# Patient Record
Sex: FEMALE | Race: BLACK OR AFRICAN AMERICAN | NOT HISPANIC OR LATINO | Employment: OTHER | URBAN - METROPOLITAN AREA
[De-identification: names, ages, dates, MRNs, and addresses within clinical notes are randomized per-mention and may not be internally consistent; named-entity substitution may affect disease eponyms.]

---

## 2023-04-21 DIAGNOSIS — I50.32 CHRONIC HEART FAILURE WITH PRESERVED EJECTION FRACTION (MULTI): Primary | ICD-10-CM

## 2023-04-28 DIAGNOSIS — I50.32 CHRONIC HEART FAILURE WITH PRESERVED EJECTION FRACTION (MULTI): ICD-10-CM

## 2023-04-28 RX ORDER — METOLAZONE 5 MG/1
TABLET ORAL
Qty: 4 TABLET | Refills: 11 | Status: SHIPPED | OUTPATIENT
Start: 2023-04-28 | End: 2023-10-10 | Stop reason: SDUPTHER

## 2023-04-28 RX ORDER — METOLAZONE 5 MG/1
TABLET ORAL
Qty: 4 TABLET | Refills: 11 | Status: SHIPPED | OUTPATIENT
Start: 2023-04-28 | End: 2023-04-28 | Stop reason: SDUPTHER

## 2023-05-16 LAB
ALANINE AMINOTRANSFERASE (SGPT) (U/L) IN SER/PLAS: 6 U/L (ref 7–45)
ALBUMIN (G/DL) IN SER/PLAS: 3.6 G/DL (ref 3.4–5)
ALKALINE PHOSPHATASE (U/L) IN SER/PLAS: 85 U/L (ref 33–110)
ANION GAP IN SER/PLAS: 12 MMOL/L (ref 10–20)
ASPARTATE AMINOTRANSFERASE (SGOT) (U/L) IN SER/PLAS: 25 U/L (ref 9–39)
BASOPHILS (10*3/UL) IN BLOOD BY AUTOMATED COUNT: NORMAL
BASOPHILS/100 LEUKOCYTES IN BLOOD BY AUTOMATED COUNT: NORMAL
BILIRUBIN TOTAL (MG/DL) IN SER/PLAS: 1.3 MG/DL (ref 0–1.2)
CALCIUM (MG/DL) IN SER/PLAS: 9 MG/DL (ref 8.6–10.6)
CARBON DIOXIDE, TOTAL (MMOL/L) IN SER/PLAS: 45 MMOL/L (ref 21–32)
CHLORIDE (MMOL/L) IN SER/PLAS: 89 MMOL/L (ref 98–107)
CREATININE (MG/DL) IN SER/PLAS: 1.09 MG/DL (ref 0.5–1.05)
EOSINOPHILS (10*3/UL) IN BLOOD BY AUTOMATED COUNT: NORMAL
EOSINOPHILS/100 LEUKOCYTES IN BLOOD BY AUTOMATED COUNT: NORMAL
ERYTHROCYTE DISTRIBUTION WIDTH (RATIO) BY AUTOMATED COUNT: NORMAL
ERYTHROCYTE MEAN CORPUSCULAR HEMOGLOBIN CONCENTRATION (G/DL) BY AUTOMATED: NORMAL
ERYTHROCYTE MEAN CORPUSCULAR VOLUME (FL) BY AUTOMATED COUNT: NORMAL
ERYTHROCYTES (10*6/UL) IN BLOOD BY AUTOMATED COUNT: NORMAL
FERRITIN (UG/LL) IN SER/PLAS: 38 UG/L (ref 8–150)
GFR FEMALE: 61 ML/MIN/1.73M2
GLUCOSE (MG/DL) IN SER/PLAS: 86 MG/DL (ref 74–99)
HEMATOCRIT (%) IN BLOOD BY AUTOMATED COUNT: NORMAL
HEMOGLOBIN (G/DL) IN BLOOD: NORMAL
HEMOGLOBIN (PG) IN RETICULOCYTES: NORMAL
IMMATURE GRANULOCYTES/100 LEUKOCYTES IN BLOOD BY AUTOMATED COUNT: NORMAL
IMMATURE RETIC FRACTION: NORMAL
IRON (UG/DL) IN SER/PLAS: 78 UG/DL (ref 35–150)
IRON BINDING CAPACITY (UG/DL) IN SER/PLAS: 429 UG/DL (ref 240–445)
IRON SATURATION (%) IN SER/PLAS: 18 % (ref 25–45)
LACTATE DEHYDROGENASE (U/L) IN SER/PLAS BY LAC->PYR RXN: 281 U/L (ref 84–246)
LEUKOCYTES (10*3/UL) IN BLOOD BY AUTOMATED COUNT: NORMAL
LYMPHOCYTES (10*3/UL) IN BLOOD BY AUTOMATED COUNT: NORMAL
LYMPHOCYTES/100 LEUKOCYTES IN BLOOD BY AUTOMATED COUNT: NORMAL
MAGNESIUM (MG/DL) IN SER/PLAS: 2.07 MG/DL (ref 1.6–2.4)
MANUAL DIFFERENTIAL Y/N: NORMAL
MONOCYTES (10*3/UL) IN BLOOD BY AUTOMATED COUNT: NORMAL
MONOCYTES/100 LEUKOCYTES IN BLOOD BY AUTOMATED COUNT: NORMAL
NEUTROPHILS (10*3/UL) IN BLOOD BY AUTOMATED COUNT: NORMAL
NEUTROPHILS/100 LEUKOCYTES IN BLOOD BY AUTOMATED COUNT: NORMAL
NRBC (PER 100 WBCS) BY AUTOMATED COUNT: NORMAL
PLATELETS (10*3/UL) IN BLOOD AUTOMATED COUNT: NORMAL
POTASSIUM (MMOL/L) IN SER/PLAS: 3 MMOL/L (ref 3.5–5.3)
PROTEIN TOTAL: 7.7 G/DL (ref 6.4–8.2)
RETICULOCYTES (10*3/UL) IN BLOOD: NORMAL
RETICULOCYTES/100 ERYTHROCYTES IN BLOOD BY AUTOMATED COUNT: NORMAL
SODIUM (MMOL/L) IN SER/PLAS: 143 MMOL/L (ref 136–145)
UREA NITROGEN (MG/DL) IN SER/PLAS: 14 MG/DL (ref 6–23)

## 2023-05-17 LAB — HOMOCYSTEINE (UMOL/L) IN SER/PLAS: 16.08 UMOL/L (ref 5–13.9)

## 2023-06-02 LAB
BASOPHILS (10*3/UL) IN BLOOD BY AUTOMATED COUNT: 0.04 X10E9/L (ref 0–0.1)
BASOPHILS/100 LEUKOCYTES IN BLOOD BY AUTOMATED COUNT: 0.9 % (ref 0–2)
EOSINOPHILS (10*3/UL) IN BLOOD BY AUTOMATED COUNT: 0.14 X10E9/L (ref 0–0.7)
EOSINOPHILS/100 LEUKOCYTES IN BLOOD BY AUTOMATED COUNT: 3 % (ref 0–6)
ERYTHROCYTE DISTRIBUTION WIDTH (RATIO) BY AUTOMATED COUNT: 16.6 % (ref 11.5–14.5)
ERYTHROCYTE MEAN CORPUSCULAR HEMOGLOBIN CONCENTRATION (G/DL) BY AUTOMATED: 30.9 G/DL (ref 32–36)
ERYTHROCYTE MEAN CORPUSCULAR VOLUME (FL) BY AUTOMATED COUNT: 99 FL (ref 80–100)
ERYTHROCYTES (10*6/UL) IN BLOOD BY AUTOMATED COUNT: 3.8 X10E12/L (ref 4–5.2)
HEMATOCRIT (%) IN BLOOD BY AUTOMATED COUNT: 37.6 % (ref 36–46)
HEMOGLOBIN (G/DL) IN BLOOD: 11.6 G/DL (ref 12–16)
IMMATURE GRANULOCYTES/100 LEUKOCYTES IN BLOOD BY AUTOMATED COUNT: 0.2 % (ref 0–0.9)
LEUKOCYTES (10*3/UL) IN BLOOD BY AUTOMATED COUNT: 4.7 X10E9/L (ref 4.4–11.3)
LYMPHOCYTES (10*3/UL) IN BLOOD BY AUTOMATED COUNT: 0.92 X10E9/L (ref 1.2–4.8)
LYMPHOCYTES/100 LEUKOCYTES IN BLOOD BY AUTOMATED COUNT: 19.8 % (ref 13–44)
MONOCYTES (10*3/UL) IN BLOOD BY AUTOMATED COUNT: 0.72 X10E9/L (ref 0.1–1)
MONOCYTES/100 LEUKOCYTES IN BLOOD BY AUTOMATED COUNT: 15.5 % (ref 2–10)
NEUTROPHILS (10*3/UL) IN BLOOD BY AUTOMATED COUNT: 2.82 X10E9/L (ref 1.2–7.7)
NEUTROPHILS/100 LEUKOCYTES IN BLOOD BY AUTOMATED COUNT: 60.6 % (ref 40–80)
PLATELETS (10*3/UL) IN BLOOD AUTOMATED COUNT: 121 X10E9/L (ref 150–450)

## 2023-09-16 ENCOUNTER — HOSPITAL ENCOUNTER (OUTPATIENT)
Facility: HOSPITAL | Age: 53
Setting detail: OUTPATIENT SURGERY
End: 2023-09-16
Attending: INTERNAL MEDICINE | Admitting: INTERNAL MEDICINE
Payer: COMMERCIAL

## 2023-09-28 PROBLEM — I10 HYPERTENSION: Status: ACTIVE | Noted: 2023-09-28

## 2023-09-28 PROBLEM — R73.03 PREDIABETES: Status: ACTIVE | Noted: 2023-09-28

## 2023-09-28 PROBLEM — R93.89 ABNORMAL CT OF THE CHEST: Status: ACTIVE | Noted: 2023-09-28

## 2023-09-28 PROBLEM — H52.03 HYPEROPIA OF BOTH EYES WITH ASTIGMATISM AND PRESBYOPIA: Status: ACTIVE | Noted: 2023-09-28

## 2023-09-28 PROBLEM — J47.9 BRONCHIECTASIS (MULTI): Status: ACTIVE | Noted: 2023-09-28

## 2023-09-28 PROBLEM — Z85.3 PERSONAL HISTORY OF MALIGNANT NEOPLASM OF BREAST: Status: ACTIVE | Noted: 2023-09-28

## 2023-09-28 PROBLEM — K21.9 GERD (GASTROESOPHAGEAL REFLUX DISEASE): Status: ACTIVE | Noted: 2023-09-28

## 2023-09-28 PROBLEM — H05.20 PROPTOSIS: Status: ACTIVE | Noted: 2023-09-28

## 2023-09-28 PROBLEM — G56.00 CARPAL TUNNEL SYNDROME: Status: ACTIVE | Noted: 2023-09-28

## 2023-09-28 PROBLEM — I83.009 VENOUS STASIS ULCER WITH VARICOSE VEINS OF LOWER EXTREMITY (MULTI): Status: ACTIVE | Noted: 2023-09-28

## 2023-09-28 PROBLEM — L97.909 VENOUS STASIS ULCER WITH VARICOSE VEINS OF LOWER EXTREMITY (MULTI): Status: ACTIVE | Noted: 2023-09-28

## 2023-09-28 PROBLEM — I34.0 MITRAL REGURGITATION: Status: ACTIVE | Noted: 2023-09-28

## 2023-09-28 PROBLEM — G40.909 SEIZURE DISORDER (MULTI): Status: ACTIVE | Noted: 2023-09-28

## 2023-09-28 PROBLEM — R00.2 PALPITATIONS: Status: ACTIVE | Noted: 2023-09-28

## 2023-09-28 PROBLEM — I97.2 LYMPHEDEMA SYNDROME, POSTMASTECTOMY: Status: ACTIVE | Noted: 2023-09-28

## 2023-09-28 PROBLEM — J98.6 DIAPHRAGMATIC PARALYSIS: Status: ACTIVE | Noted: 2023-09-28

## 2023-09-28 PROBLEM — H52.4 HYPEROPIA OF BOTH EYES WITH ASTIGMATISM AND PRESBYOPIA: Status: ACTIVE | Noted: 2023-09-28

## 2023-09-28 PROBLEM — J98.4 RESTRICTIVE LUNG DISEASE: Status: ACTIVE | Noted: 2023-09-28

## 2023-09-28 PROBLEM — D53.9 ANEMIA, MACROCYTIC: Status: ACTIVE | Noted: 2023-09-28

## 2023-09-28 PROBLEM — E04.9 GOITER: Status: ACTIVE | Noted: 2023-09-28

## 2023-09-28 PROBLEM — J45.909 ASTHMA (HHS-HCC): Status: ACTIVE | Noted: 2023-09-28

## 2023-09-28 PROBLEM — M17.12 LEFT KNEE DJD: Status: ACTIVE | Noted: 2023-09-28

## 2023-09-28 PROBLEM — I50.30 (HFPEF) HEART FAILURE WITH PRESERVED EJECTION FRACTION (MULTI): Status: ACTIVE | Noted: 2023-09-28

## 2023-09-28 PROBLEM — M25.569 KNEE PAIN: Status: ACTIVE | Noted: 2023-09-28

## 2023-09-28 PROBLEM — H52.203 HYPEROPIA OF BOTH EYES WITH ASTIGMATISM AND PRESBYOPIA: Status: ACTIVE | Noted: 2023-09-28

## 2023-09-28 PROBLEM — E78.5 DYSLIPIDEMIA: Status: ACTIVE | Noted: 2023-09-28

## 2023-09-28 PROBLEM — H52.4 PRESBYOPIA: Status: ACTIVE | Noted: 2023-09-28

## 2023-09-28 PROBLEM — J44.9 CHRONIC OBSTRUCTIVE PULMONARY DISEASE (MULTI): Status: ACTIVE | Noted: 2023-09-28

## 2023-09-28 PROBLEM — E55.9 VITAMIN D DEFICIENCY: Status: ACTIVE | Noted: 2023-09-28

## 2023-09-28 PROBLEM — K75.81 STEATOHEPATITIS, NONALCOHOLIC: Status: ACTIVE | Noted: 2023-09-28

## 2023-09-28 PROBLEM — D51.9 VITAMIN B12 DEFICIENCY ANEMIA: Status: ACTIVE | Noted: 2023-09-28

## 2023-09-28 PROBLEM — G47.33 OBSTRUCTIVE SLEEP APNEA: Status: ACTIVE | Noted: 2023-09-28

## 2023-09-28 PROBLEM — R23.2 HOT FLASHES: Status: ACTIVE | Noted: 2023-09-28

## 2023-09-28 PROBLEM — R09.02 HYPOXIA: Status: ACTIVE | Noted: 2023-09-28

## 2023-09-28 RX ORDER — ACETAMINOPHEN 500 MG
1 TABLET ORAL DAILY
COMMUNITY
Start: 2022-07-21 | End: 2023-10-10 | Stop reason: ENTERED-IN-ERROR

## 2023-09-28 RX ORDER — BUDESONIDE AND FORMOTEROL FUMARATE DIHYDRATE 160; 4.5 UG/1; UG/1
2 AEROSOL RESPIRATORY (INHALATION) 2 TIMES DAILY
COMMUNITY
Start: 2017-09-05 | End: 2023-10-10 | Stop reason: SDUPTHER

## 2023-09-28 RX ORDER — LANOLIN ALCOHOL/MO/W.PET/CERES
1 CREAM (GRAM) TOPICAL DAILY
COMMUNITY
Start: 2022-11-14 | End: 2023-10-10 | Stop reason: ENTERED-IN-ERROR

## 2023-09-28 RX ORDER — SPIRONOLACTONE 50 MG/1
1 TABLET, FILM COATED ORAL DAILY
COMMUNITY
Start: 2022-05-31 | End: 2023-10-10 | Stop reason: ENTERED-IN-ERROR

## 2023-09-28 RX ORDER — EPLERENONE 50 MG/1
TABLET, FILM COATED ORAL
COMMUNITY
Start: 2022-11-18 | End: 2023-10-10 | Stop reason: ENTERED-IN-ERROR

## 2023-09-28 RX ORDER — CHOLECALCIFEROL (VITAMIN D3) 25 MCG
1 TABLET ORAL DAILY
COMMUNITY
End: 2023-10-10 | Stop reason: ENTERED-IN-ERROR

## 2023-09-28 RX ORDER — LORATADINE 10 MG/1
1 TABLET ORAL DAILY
COMMUNITY
Start: 2014-05-04 | End: 2023-10-10 | Stop reason: ENTERED-IN-ERROR

## 2023-09-28 RX ORDER — ALBUTEROL SULFATE 90 UG/1
2 AEROSOL, METERED RESPIRATORY (INHALATION) EVERY 4 HOURS PRN
COMMUNITY
End: 2023-10-30 | Stop reason: SDUPTHER

## 2023-09-28 RX ORDER — AMMONIUM LACTATE 12 G/100G
385 CREAM TOPICAL 2 TIMES DAILY
COMMUNITY
Start: 2022-10-23 | End: 2023-11-20 | Stop reason: WASHOUT

## 2023-09-28 RX ORDER — BUDESONIDE AND FORMOTEROL FUMARATE DIHYDRATE 160; 4.5 UG/1; UG/1
2 AEROSOL RESPIRATORY (INHALATION) 2 TIMES DAILY
COMMUNITY
Start: 2015-12-03 | End: 2023-10-30 | Stop reason: SDUPTHER

## 2023-09-28 RX ORDER — LANOLIN ALCOHOL/MO/W.PET/CERES
1 CREAM (GRAM) TOPICAL DAILY
COMMUNITY

## 2023-09-28 RX ORDER — TORSEMIDE 20 MG/1
40 TABLET ORAL 2 TIMES DAILY
COMMUNITY
End: 2023-10-17 | Stop reason: SDUPTHER

## 2023-09-28 RX ORDER — POTASSIUM CHLORIDE 20 MEQ/1
20 TABLET, EXTENDED RELEASE ORAL 2 TIMES DAILY
COMMUNITY
End: 2024-03-28 | Stop reason: SDUPTHER

## 2023-09-28 RX ORDER — ACETAMINOPHEN, DEXTROMETHORPHAN HBR, DOXYLAMINE SUCCINATE, PHENYLEPHRINE HCL 650; 20; 12.5; 1 MG/30ML; MG/30ML; MG/30ML; MG/30ML
1 SOLUTION ORAL DAILY
COMMUNITY
Start: 2016-09-23 | End: 2023-10-10 | Stop reason: SDUPTHER

## 2023-09-28 RX ORDER — METOLAZONE 5 MG/1
5 TABLET ORAL WEEKLY
COMMUNITY
Start: 2022-05-16 | End: 2024-01-29

## 2023-09-28 RX ORDER — METOPROLOL SUCCINATE 100 MG/1
100 TABLET, EXTENDED RELEASE ORAL DAILY
COMMUNITY
End: 2024-01-03 | Stop reason: SDUPTHER

## 2023-09-28 RX ORDER — DAPAGLIFLOZIN 10 MG/1
1 TABLET, FILM COATED ORAL DAILY
COMMUNITY
Start: 2022-11-17 | End: 2023-10-10 | Stop reason: SDUPTHER

## 2023-09-28 RX ORDER — LEVETIRACETAM 500 MG/1
1 TABLET ORAL 2 TIMES DAILY
COMMUNITY
Start: 2023-02-12 | End: 2024-01-03 | Stop reason: SDUPTHER

## 2023-09-28 RX ORDER — ALBUTEROL SULFATE 0.63 MG/3ML
0.63 SOLUTION RESPIRATORY (INHALATION) EVERY 4 HOURS PRN
COMMUNITY
Start: 2017-06-28

## 2023-10-10 ENCOUNTER — OFFICE VISIT (OUTPATIENT)
Dept: CARDIOLOGY | Facility: HOSPITAL | Age: 53
End: 2023-10-10
Payer: MEDICARE

## 2023-10-10 VITALS
HEART RATE: 75 BPM | WEIGHT: 197 LBS | BODY MASS INDEX: 36.03 KG/M2 | SYSTOLIC BLOOD PRESSURE: 125 MMHG | OXYGEN SATURATION: 90 % | DIASTOLIC BLOOD PRESSURE: 75 MMHG

## 2023-10-10 DIAGNOSIS — I50.32 CHRONIC DIASTOLIC HEART FAILURE (MULTI): Primary | ICD-10-CM

## 2023-10-10 PROCEDURE — 99213 OFFICE O/P EST LOW 20 MIN: CPT | Performed by: NURSE PRACTITIONER

## 2023-10-10 PROCEDURE — 3078F DIAST BP <80 MM HG: CPT | Performed by: NURSE PRACTITIONER

## 2023-10-10 PROCEDURE — 3074F SYST BP LT 130 MM HG: CPT | Performed by: NURSE PRACTITIONER

## 2023-10-10 PROCEDURE — 3008F BODY MASS INDEX DOCD: CPT | Performed by: NURSE PRACTITIONER

## 2023-10-10 ASSESSMENT — PAIN SCALES - GENERAL: PAINLEVEL: 5

## 2023-10-10 NOTE — PROGRESS NOTES
Chief Complaint:  52yo patient here for post-hospitalization follow up for heart failure.     HPI  Presented to [facility] ED with hypoxia SpO2 70% on 3L O2 via NC. There was a concern for COPD exacerbation but she was given one dose of steroids but this was not continued.  Treated for ADHF w/IV diuretics. She also had an NIKUNJ with  Cr of 1.5 with a baseline of about 1.  Nephro saw the patient and believed it was NIKUNJ and recommended closely monitoring with diuretics. On the day of discharge 9/11, patient states her weight was 235.5 (baseline 215 lb).  I have discussed with the patient in details regarding her weight gain and fluid retention with recommendation for further diuresis before discharging.   However patient states her symptoms are back to baseline and she is clinically feeling well, also stating that she has echo appointment on the day of discharge as well as a cardiologist appointment with Dr. Hilton on the 14th.  Patient request to  be discharged so she can be more active with outpatient physical therapy, stating she will be compliant with the medication and follow-ups. Discharge weight 233lbs.      PMHx: Hodgkin's Lymphoma (1993) s/p mantle cell radiation, Left Breast CA,  HTN, COPD with Chronic Resp Failure,  ENRIQUE,  HFpEF, Seizure disorder  PSHx: Bilateral Mastectomy    Presents today, able to ambulate without assistance. Denies chest pain. Endorses intermittent palpitations, 2-3 episodes/day, lasting <5 minutes resolves w/o intervention. SOB at rest, 3 liters O2 via NC continuously. Endorses orthopnea, sleeps in hospitals bed w/ HOB elevated. Adherent w/BiPAP. Endorses lightheadedness aggravated by positional changes. Denies falls/syncope. BLE edema improving. Medication adherent-no pill bottles or list today, has been taking Torsemide 40mg BID x 1 week. Poor appetite. Denies N/V/D.  No batteries for scale.      Review of Systems   Constitutional: Positive for decreased appetite.   Cardiovascular:   Positive for dyspnea on exertion, leg swelling, orthopnea and palpitations. Negative for chest pain, irregular heartbeat, near-syncope, paroxysmal nocturnal dyspnea and syncope.   Respiratory:  Positive for shortness of breath. Negative for sleep disturbances due to breathing.    Gastrointestinal:  Negative for diarrhea, nausea and vomiting.   Neurological:  Positive for light-headedness. Negative for dizziness.     Visit Vitals  /75 (BP Location: Right arm, Patient Position: Sitting)   Pulse 75   Wt 89.4 kg (197 lb)   SpO2 90%   BMI 36.03 kg/m²   BSA 1.98 m²       Objective   Vitals reviewed.   Constitutional:       Appearance: Obese. Chronically ill-appearing.   Neck:      Vascular: JVD present. No hepatojugular reflux. JVD elevated.   Pulmonary:      Effort: Pulmonary effort is normal.      Breath sounds: Normal breath sounds.   Cardiovascular:      Normal rate. Regular rhythm.      Murmurs: There is no murmur.      No gallop.  No click. No rub.   Edema:     Peripheral edema present.     Ankle: bilateral 1+ edema of the ankle.     Feet: bilateral 1+ edema of the feet.  Abdominal:      General: There is no distension.   Skin:     General: Skin is warm and dry.   Neurological:      Mental Status: Alert and oriented to person, place and time.       Lab Review:   Lab Results   Component Value Date     09/10/2023    K 4.1 09/10/2023    CL 86 (L) 09/10/2023    CO2 >45 (AA) 09/10/2023    BUN 29 (H) 09/10/2023    CREATININE 1.12 (H) 09/10/2023    GLUCOSE 154 (H) 09/10/2023    CALCIUM 8.7 09/10/2023       Current Outpatient Medications:     albuterol (Ventolin HFA) 90 mcg/actuation inhaler, Inhale 2 puffs every 4 hours if needed for wheezing or shortness of breath., Disp: , Rfl:     albuterol 0.63 mg/3 mL nebulizer solution, Take 3 mL (0.63 mg) by nebulization every 4 hours if needed., Disp: , Rfl:     budesonide-formoteroL (Symbicort) 160-4.5 mcg/actuation inhaler, Inhale 2 puffs twice a day., Disp: , Rfl:      cyanocobalamin (Vitamin B-12) 1,000 mcg tablet, Take 1 tablet (1,000 mcg) by mouth once daily., Disp: , Rfl:     dapagliflozin propanediol (Farxiga) 10 mg, TAKE 1 TABLET BY MOUTH DAILY, Disp: 5 tablet, Rfl: 0    docusate sodium (Colace) 100 mg capsule, TAKE 1 CAPSULE BY MOUTH TWO TIMES A DAY AS NEEDED, Disp: 10 capsule, Rfl: 0    ipratropium-albuteroL (Duo-Neb) 0.5-2.5 mg/3 mL nebulizer solution, USE 1 VIAL IN NEBULIZER EVERY 8 HOURS AS DIRECTED, Disp: 45 mL, Rfl: 0    levETIRAcetam (Keppra) 500 mg tablet, Take 1 tablet (500 mg) by mouth 2 times a day., Disp: , Rfl:     metOLazone (Zaroxolyn) 5 mg tablet, Take 1 tablet (5 mg) by mouth 1 (one) time per week in the early morning.., Disp: , Rfl:     metoprolol succinate XL (Toprol-XL) 100 mg 24 hr tablet, Take 1 tablet (100 mg) by mouth once daily., Disp: , Rfl:     oxygen (O2) therapy, 3L BiPAP BLEED NIGHTLY, Disp: , Rfl:     potassium chloride CR 20 mEq ER tablet, Take 1 tablet (20 mEq) by mouth 2 times a day., Disp: , Rfl:     torsemide (Demadex) 20 mg tablet, Take 2 tablets (40 mg) by mouth 2 times a day., Disp: , Rfl:     acetaminophen-pamabrom 325-25 mg tablet, Take 325 tablets by mouth every 4 hours if needed., Disp: , Rfl:     ammonium lactate (Amlactin) 12 % cream, Apply 385 Applications topically 2 times a day., Disp: , Rfl:     Assessment/Plan   Diastolic Heart Failure  SOB on exertion. Orthopnea +1 pitting BLE edema. Elevated JVD. Appears hypervolemic and normotensive on exam today. Most recent TTE 9/29/23 LVEF 60-65% w/moderate to severe MR. Mildly reduced RV systolic function. Severe TR. Most recent BNP 9/10/23 846pg/mL.  NYHA Class III Stage C HFpEF.  Continue Farxiga 10mg every day. Reports Torsemide 40mg BID x 1 week. Order for RFP.

## 2023-10-10 NOTE — PATIENT INSTRUCTIONS
Please have labs drawn today. We will call you with any abnormal results.   Continue current medications as ordered.   Call [phone] to schedule 1 month follow up with Dr. Hilton.

## 2023-10-11 ASSESSMENT — ENCOUNTER SYMPTOMS
DIZZINESS: 0
PALPITATIONS: 1
DIARRHEA: 0
ORTHOPNEA: 1
PND: 0
LIGHT-HEADEDNESS: 1
VOMITING: 0
NAUSEA: 0
NEAR-SYNCOPE: 0
SLEEP DISTURBANCES DUE TO BREATHING: 0
IRREGULAR HEARTBEAT: 0
SHORTNESS OF BREATH: 1
SYNCOPE: 0
DYSPNEA ON EXERTION: 1
DECREASED APPETITE: 1

## 2023-10-16 ENCOUNTER — LAB (OUTPATIENT)
Dept: LAB | Facility: LAB | Age: 53
End: 2023-10-16
Payer: COMMERCIAL

## 2023-10-16 ENCOUNTER — TELEPHONE (OUTPATIENT)
Dept: PRIMARY CARE | Facility: HOSPITAL | Age: 53
End: 2023-10-16

## 2023-10-16 DIAGNOSIS — I50.32 CHRONIC DIASTOLIC HEART FAILURE (MULTI): ICD-10-CM

## 2023-10-16 LAB
ALBUMIN SERPL BCP-MCNC: 3.9 G/DL (ref 3.4–5)
ANION GAP SERPL CALC-SCNC: 10 MMOL/L (ref 10–20)
BUN SERPL-MCNC: 18 MG/DL (ref 6–23)
CALCIUM SERPL-MCNC: 9.6 MG/DL (ref 8.6–10.3)
CHLORIDE SERPL-SCNC: 86 MMOL/L (ref 98–107)
CO2 SERPL-SCNC: 45 MMOL/L (ref 21–32)
CREAT SERPL-MCNC: 0.95 MG/DL (ref 0.5–1.05)
GFR SERPL CREATININE-BSD FRML MDRD: 72 ML/MIN/1.73M*2
GLUCOSE SERPL-MCNC: 69 MG/DL (ref 74–99)
PHOSPHATE SERPL-MCNC: 3.4 MG/DL (ref 2.5–4.9)
POTASSIUM SERPL-SCNC: 3.3 MMOL/L (ref 3.5–5.3)
SODIUM SERPL-SCNC: 138 MMOL/L (ref 136–145)

## 2023-10-16 PROCEDURE — 80069 RENAL FUNCTION PANEL: CPT

## 2023-10-16 PROCEDURE — 36415 COLL VENOUS BLD VENIPUNCTURE: CPT

## 2023-10-16 NOTE — TELEPHONE ENCOUNTER
PATIENT REQUESTING REFILLS ON TORSEMIDE 20MG.     [facility] [address]    PATIENT STATES SHE TAKES 80MG PER DAY

## 2023-10-17 ENCOUNTER — TELEPHONE (OUTPATIENT)
Dept: CARDIOLOGY | Facility: HOSPITAL | Age: 53
End: 2023-10-17
Payer: MEDICARE

## 2023-10-17 DIAGNOSIS — I50.30 HEART FAILURE WITH PRESERVED EJECTION FRACTION, UNSPECIFIED HF CHRONICITY (MULTI): Primary | ICD-10-CM

## 2023-10-17 RX ORDER — TORSEMIDE 20 MG/1
40 TABLET ORAL 2 TIMES DAILY
Qty: 120 TABLET | Refills: 11 | Status: SHIPPED | OUTPATIENT
Start: 2023-10-17

## 2023-10-17 NOTE — TELEPHONE ENCOUNTER
Called patient to review RFP 10/16/23 results. Notable for chronically elevated bicarbonate and new hypokalemia in setting of recent diuretic escalation. Unable to reach patient. Left voicemail message with contact information and request for return call.

## 2023-10-18 NOTE — TELEPHONE ENCOUNTER
Received in-basket request to refill torsemide 20 mg, 2 pills, BID for total of 80 mg daily. Chart reviewed. Patient's outpatient cardiology office attempted to reach patient and left voicemail regarding patient's abnormal RFP noting chronically elevated bicarbonate and new hypokalemia in setting of recent diuretic escalation. It appears patient's torsemide 80 mg daily regimen is relatively new post-hospitalization discharge on 09/11.     Attempted to call patient regarding need to follow up with outpatient cardiology to discuss potential medication adjustment prior to refill on torsemide. Unable to reach patient by phone this attempt.

## 2023-10-18 NOTE — TELEPHONE ENCOUNTER
"Rx Refill Request Telephone Encounter    Name:  Rosita Treviño  :  [id number]  Medication Name:  ***  {Dose (Optional):42015::\"***\"}  {Route (Optional):63245::\"***\"}  {Frequency (Optional):74112::\"***\"}  {Quantity (Optional):14910::\"***\"}  {Directions (Optional):40828::\"***\"}  Specific Pharmacy location:  ***  Date of last appointment:  ***  Date of next appointment:  ***  Best number to reach patient:  ***          Result Communication    No results found from the In Basket message.    8:25 AM      Results {WERE / WERE NOT:22872} successfully communicated with the {RHEUM PARENT/PATIENT:69196} and they {AMB Acknowledged/Did Not Acknowledge:61127} their understanding.    "

## 2023-10-30 ENCOUNTER — OFFICE VISIT (OUTPATIENT)
Dept: PULMONOLOGY | Facility: HOSPITAL | Age: 53
End: 2023-10-30
Payer: MEDICARE

## 2023-10-30 VITALS
WEIGHT: 194 LBS | DIASTOLIC BLOOD PRESSURE: 58 MMHG | RESPIRATION RATE: 20 BRPM | BODY MASS INDEX: 35.7 KG/M2 | OXYGEN SATURATION: 100 % | HEIGHT: 62 IN | HEART RATE: 77 BPM | TEMPERATURE: 97.2 F | SYSTOLIC BLOOD PRESSURE: 94 MMHG

## 2023-10-30 DIAGNOSIS — R09.02 HYPOXIA: ICD-10-CM

## 2023-10-30 DIAGNOSIS — J98.4 RESTRICTIVE LUNG DISEASE: ICD-10-CM

## 2023-10-30 DIAGNOSIS — R93.89 ABNORMAL CT OF THE CHEST: ICD-10-CM

## 2023-10-30 DIAGNOSIS — J45.909 MILD ASTHMA, UNSPECIFIED WHETHER COMPLICATED, UNSPECIFIED WHETHER PERSISTENT (HHS-HCC): Primary | ICD-10-CM

## 2023-10-30 PROCEDURE — 99215 OFFICE O/P EST HI 40 MIN: CPT | Performed by: NURSE PRACTITIONER

## 2023-10-30 PROCEDURE — 3008F BODY MASS INDEX DOCD: CPT | Performed by: NURSE PRACTITIONER

## 2023-10-30 PROCEDURE — 3074F SYST BP LT 130 MM HG: CPT | Performed by: NURSE PRACTITIONER

## 2023-10-30 PROCEDURE — 3078F DIAST BP <80 MM HG: CPT | Performed by: NURSE PRACTITIONER

## 2023-10-30 PROCEDURE — 1036F TOBACCO NON-USER: CPT | Performed by: NURSE PRACTITIONER

## 2023-10-30 RX ORDER — ALBUTEROL SULFATE 90 UG/1
2 AEROSOL, METERED RESPIRATORY (INHALATION) EVERY 4 HOURS PRN
Qty: 18 G | Refills: 3 | Status: SHIPPED | OUTPATIENT
Start: 2023-10-30

## 2023-10-30 RX ORDER — BUDESONIDE AND FORMOTEROL FUMARATE DIHYDRATE 160; 4.5 UG/1; UG/1
2 AEROSOL RESPIRATORY (INHALATION)
Qty: 1 EACH | Refills: 11 | Status: SHIPPED | OUTPATIENT
Start: 2023-10-30

## 2023-10-30 SDOH — ECONOMIC STABILITY: FOOD INSECURITY: WITHIN THE PAST 12 MONTHS, YOU WORRIED THAT YOUR FOOD WOULD RUN OUT BEFORE YOU GOT MONEY TO BUY MORE.: NEVER TRUE

## 2023-10-30 SDOH — ECONOMIC STABILITY: FOOD INSECURITY: WITHIN THE PAST 12 MONTHS, THE FOOD YOU BOUGHT JUST DIDN'T LAST AND YOU DIDN'T HAVE MONEY TO GET MORE.: NEVER TRUE

## 2023-10-30 ASSESSMENT — ENCOUNTER SYMPTOMS
ARTHRALGIAS: 0
VOICE CHANGE: 0
VOMITING: 0
AGITATION: 0
DEPRESSION: 0
NUMBNESS: 0
WEAKNESS: 0
FEVER: 0
SHORTNESS OF BREATH: 1
FATIGUE: 0
HEADACHES: 0
NAUSEA: 0
NERVOUS/ANXIOUS: 0
OCCASIONAL FEELINGS OF UNSTEADINESS: 0
EYE PAIN: 0
DIARRHEA: 0
ABDOMINAL PAIN: 0
LOSS OF SENSATION IN FEET: 0
SINUS PRESSURE: 0
BACK PAIN: 0
DIZZINESS: 0
MYALGIAS: 0
PALPITATIONS: 0
RHINORRHEA: 0
JOINT SWELLING: 0

## 2023-10-30 ASSESSMENT — PAIN SCALES - GENERAL: PAINLEVEL: 6

## 2023-10-30 NOTE — ASSESSMENT & PLAN NOTE
Scarring on CT chest - stable for several years   - continue BiPAP at night with 3L   - continue to follow with sleep medicine   - referral to pulmonary rehab

## 2023-10-30 NOTE — PROGRESS NOTES
Patient: Rosita Treviño    [id number]  : 1970 -- AGE 53 y.o.    Provider: BAN Lozano-CNP     Location [facility]   Service Date: 10/30/2023              [facility]  Follow up visit note      HISTORY OF PRESENT ILLNESS     DME: [facility]   Cardiology: Dr. Merino/ Dr. Hilton     HISTORY OF PRESENT ILLNESS     She states she was living in [place] and moved back to [place]. She was recently admitted in 2023 for CHF exacerbation and fluid overload. She states her weight has been going up/ down - states she has been losing weight 198 - > 183 -> 181 -> 194. She feels she is having a little LE swelling and thinks this may be related to fluid.  She is frustrated related to her fluid status - has been watching her fluid and salt intake. She uses her symbicort twice daily and PRN albuterol not often. She has been using her albuterol nebulizers rarely - she had hospital at home and they recommended, but she did not feel like she needed it. She feels the symbicort works especially with the spacer. She feels she is filling up with fluid and has increased SOB and heaviness in her chest. She is tired of her oxygen - she would like to get rid of it if she could. She did not taker her torsemide today -- waiting until after she goes home. She has dry cough. She previously had thick clear mucous - lately more dry. She is triggered by smells - cleaning products and smoke. She will notice wheezing  - rarely uses the albuterol so not sure if it helps. She has MODI when she is holding onto fluid - states once she goes home and takes her torsemide she feels her breathing is much better. She will have some SOB at rest when her fluid is up. She has runny nose, post nasal drip, and sneezing. She denies any congestion. She states dust makes her sneeze.  She has taken claritin in the past - feels it helps her. She denies any GERD. She denies any chest pain - more  chest pressure from her fluid status. She will at times get phantom pain from her incisions in her chest. She uses ENRIQUE - on BiPAP and 3L.     Dr. Jim - 7/7/22 - 51 year old female with history of CHF, seizure disorder), chronic respiratory failure 2/2   asthma and COPD (baseline use 3L O2 PRN on NC at night) Hodgkin's lymphoma in remission, L BRCA s/p mastectomy in remission, ENRIQUE, and HTN  - She is here for pulmonary evaluation    - Previously seen by Dr. Sy in 2018 for her asthma    Was recently admitted for encephalopathy/acute on chronic respiratory failure from May 11-18 , treated for chf/asthma exacerbation  Prior to this she had another episode of pulmonary edema prior to moving back to [place]    Was in [place] for 3 years--just moved back in May 2022   Previously been to pulmonary rehab    Is using BiPAP at night --has been using every night    Seasonal allergies    Pulm meds: symbicort, albuterol     ALLERGIES AND MEDICATIONS     ALLERGIES  Allergies   Allergen Reactions    House Dust Cough    Iodinated Contrast Media Itching    Irbesartan Unknown    Aspirin Rash    Penicillins Rash       MEDICATIONS  Current Outpatient Medications   Medication Sig Dispense Refill    acetaminophen-pamabrom 325-25 mg tablet Take 325 tablets by mouth every 4 hours if needed.      albuterol 0.63 mg/3 mL nebulizer solution Take 3 mL (0.63 mg) by nebulization every 4 hours if needed.      cyanocobalamin (Vitamin B-12) 1,000 mcg tablet Take 1 tablet (1,000 mcg) by mouth once daily.      dapagliflozin propanediol (Farxiga) 10 mg TAKE 1 TABLET BY MOUTH DAILY 5 tablet 0    docusate sodium (Colace) 100 mg capsule TAKE 1 CAPSULE BY MOUTH TWO TIMES A DAY AS NEEDED 10 capsule 0    ipratropium-albuteroL (Duo-Neb) 0.5-2.5 mg/3 mL nebulizer solution USE 1 VIAL IN NEBULIZER EVERY 8 HOURS AS DIRECTED 45 mL 0    levETIRAcetam (Keppra) 500 mg tablet Take 1 tablet (500 mg) by mouth 2 times a day.      metoprolol succinate XL (Toprol-XL)  100 mg 24 hr tablet Take 1 tablet (100 mg) by mouth once daily.      oxygen (O2) therapy 3L BiPAP BLEED NIGHTLY      potassium chloride CR 20 mEq ER tablet Take 1 tablet (20 mEq) by mouth 2 times a day.      torsemide (Demadex) 20 mg tablet Take 2 tablets (40 mg) by mouth 2 times a day. 120 tablet 11    albuterol (Ventolin HFA) 90 mcg/actuation inhaler Inhale 2 puffs every 4 hours if needed for wheezing or shortness of breath. 18 g 3    ammonium lactate (Amlactin) 12 % cream Apply 385 Applications topically 2 times a day.      budesonide-formoteroL (Symbicort) 160-4.5 mcg/actuation inhaler Inhale 2 puffs 2 times a day. 1 each 11    metOLazone (Zaroxolyn) 5 mg tablet Take 1 tablet (5 mg) by mouth 1 (one) time per week in the early morning..       No current facility-administered medications for this visit.         PAST HISTORY     PAST MEDICAL HISTORY  - HFpEF   - seizure disorder   - COPD/ asthma - elevated right hemidiaphragm   - breast cancer (left) s/p mastectomy- 2015 - in remission   - Hodgkin's lymphoma - 1993 - s/p chemo / radiation (mediastinum) - in remission   - ENRIQUE - on BiPAP and 3L   - hysterectomy  - ? Hernia     PAST SURGICAL HISTORY  Past Surgical History:   Procedure Laterality Date    HYSTERECTOMY  11/28/2017    Hysterectomy    MASTECTOMY  07/16/2014    Breast Surgery Mastectomy       IMMUNIZATION HISTORY  Immunization History   Administered Date(s) Administered    Influenza, Unspecified 10/19/2011, 10/02/2012    Influenza, seasonal, injectable 12/02/2015, 12/02/2015    Influenza, seasonal, injectable, preservative free 09/30/2013    Pneumococcal polysaccharide vaccine, 23-valent, age 2 years and older (PNEUMOVAX 23) 12/13/2013    Tdap vaccine, age 7 year and older (BOOSTRIX) 04/19/2016       SOCIAL HISTORY  She  reports that she has never smoked. She has never used smokeless tobacco. She reports that she does not drink alcohol and does not use drugs.     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      FAMILY HISTORY  Family History   Problem Relation Name Age of Onset    Other (age macular degneration) Mother      Breast cancer Mother      Coronary artery disease Mother      Glaucoma Mother      Hypertension Mother      Heart attack Father          acute    Colon cancer Father      Coronary artery disease Father      Diabetes Father      Glaucoma Father      Heart attack Sister          acute    Hypertension Sister      Multiple sclerosis Sister      Hypertension Brother      Amblyopia Son       Several family members- asthma/ COPD - all maternal uncles and mother with COPD     RESULTS/DATA     Pulmonary Function Test Results       PFTs:   1/31/18 - FEV1/ FVC 0.75/ FEV1 2.22 (55%)/ FVC 2.74 (60%)  8/11/22 - FEV1/FVC 0.72/ FEV1 0.89 (40% - no BD resp)/ FVC 1.16 (43%)/ TLC 3.19 (76%)/ DLCO 61%     6MWT:   9/9/22 - 303m () 88 -> 96 on 4L     Cardiopulmonary exercise test - 7/24/14 - abnormal cardiopulmonary exercise test. Findings are most concistent with a circulatory impairment and the probably the condition. The pulmnoary vascular component cannot be excluded.       Chest Radiograph     XR chest 1 view 07/01/2023- The lungs are clear.  No pneumothorax or effusion is evident. The  cardiomediastinal silhouette is  not enlarged.Degenerative change is  seen of the spine and shoulders. No acute cardiopulmonary process is evident.      Chest CT Scan     CT chest:   Multiple previous - oldest 4/7/18 - Stable radiation related changes in the bilateral upper lobe, additionally scarring/chronic lung changes in the right lower lobe posteromedially. A new 4 mm noncalcified nodule in the superior segment of the left lower lobe, attention warranted in follow-up imaging. Stable 3 mm nodule in the middle lobe since to prior exam, may likely represent benign. Calcified, mediastinal lymph node, unchanged, no suspicious  adenopathy.   5/15/22 - No evidence of acute pulmonary embolism. 2. Stable postradiation therapy fibrotic changes in the bilateral lung apices and posteromedial right lower lobe. No new airspace consolidation, pleural effusion or pneumothorax. 3. Additional stable chronic findings as above.   9/4/23 -IMPRESSION: No acute cardiopulmonary process. Suggestion of distal gastric wall thickening. Please correlate for symptoms of gastritis. Mild ascites. Mild body wall edema/anasarca. Please correlate with physical examination to exclude cellulitis of the lower abdominal wall.       Echocardiogram     Echo: 9/29/23 - Left ventricular systolic function is normal with a 60-65% estimated ejection fraction.  2. There is mildly reduced right ventricular systolic function.  3. Moderate to severe mitral valve regurgitation.  4. There is moderately restricted tricuspid valve leaflet mobility.  5. Moderately elevated right ventricular systolic pressure.  6. Severe tricuspid regurgitation visualized.  7. There is malcoaptation of the TV leaflets and central tricuspid regurgitation.  8. Mild aortic valve regurgitation.  RA normal size, RV mildly enlarged - mildly reduced RV systolic fnction     REVIEW OF SYSTEMS     REVIEW OF SYSTEMS  Review of Systems   Constitutional:  Negative for fatigue and fever.   HENT:  Negative for congestion, postnasal drip, rhinorrhea, sinus pressure and voice change.    Eyes:  Negative for pain and visual disturbance.   Respiratory:  Positive for shortness of breath.    Cardiovascular:  Positive for leg swelling. Negative for chest pain and palpitations.   Gastrointestinal:  Negative for abdominal pain, diarrhea, nausea and vomiting.   Endocrine: Negative for cold intolerance and heat intolerance.   Musculoskeletal:  Negative for arthralgias, back pain, joint swelling and myalgias.   Skin:  Negative for rash.   Neurological:  Negative for dizziness, weakness, numbness and headaches.  "  Psychiatric/Behavioral:  Negative for agitation. The patient is not nervous/anxious.          PHYSICAL EXAM     VITAL SIGNS: BP 94/58 (BP Location: Right arm, Patient Position: Sitting, BP Cuff Size: Large adult) Comment: 90/ 59 WAS FIRST SET , PROVIDER NOTIFIED  Pulse 77   Temp 36.2 °C (97.2 °F) (Temporal)   Resp 20   Ht 1.575 m (5' 2\")   Wt 88 kg (194 lb)   SpO2 100% Comment: 3L O2  BMI 35.48 kg/m²      CURRENT WEIGHT: [unfilled]  BMI: [unfilled]  PREVIOUS WEIGHTS:  Wt Readings from Last 3 Encounters:   10/30/23 88 kg (194 lb)   10/10/23 89.4 kg (197 lb)   07/20/23 96.6 kg (213 lb)       Physical Exam  Vitals reviewed.   Constitutional:       General: She is not in acute distress.     Appearance: Normal appearance. She is not ill-appearing or toxic-appearing.   HENT:      Head: Normocephalic.      Nose: No rhinorrhea.   Cardiovascular:      Rate and Rhythm: Normal rate and regular rhythm.      Heart sounds: Murmur heard.   Pulmonary:      Effort: Pulmonary effort is normal. No respiratory distress.      Breath sounds: Normal breath sounds. No stridor.      Comments: Right base diminished   Abdominal:      General: Abdomen is flat.   Musculoskeletal:         General: Swelling present. Normal range of motion.      Comments: Trace LE edema    Skin:     General: Skin is warm and dry.      Nails: There is no clubbing.   Neurological:      General: No focal deficit present.      Mental Status: She is alert.   Psychiatric:         Mood and Affect: Mood normal.         Behavior: Behavior normal.         Judgment: Judgment normal.         ASSESSMENT/PLAN       Problem List and Orders  Diagnoses and all orders for this visit:  Abnormal CT of the chest  Mild asthma, unspecified whether complicated, unspecified whether persistent  Hypoxia    Assessment and Plan / Recommendations:  Problem List Items Addressed This Visit       Abnormal CT of the chest    Asthma - Primary    Overview               Current Assessment & Plan "     PFTs with restriction.   - continue symbicort twice daily - rinse mouth out afterwards   - continue albuterol 2 puffs or albuterol nebulizers every 4-6 hours as needed for shortness of breath   - will get PFTs - pre/post, 6MWT, and O2 desat          Relevant Medications    albuterol (Ventolin HFA) 90 mcg/actuation inhaler    budesonide-formoteroL (Symbicort) 160-4.5 mcg/actuation inhaler    Other Relevant Orders    Pulmonary function test    Hypoxia    Overview     6MWT in 2022 with 4L   - will get repeat O2 desat and 6MWT with next appt          Relevant Orders    Pulmonary function test    Referral to Pulmonary Rehabilitation    Restrictive lung disease    Current Assessment & Plan     Scarring on CT chest - stable for several years   - continue BiPAP at night with 3L   - continue to follow with sleep medicine   - referral to pulmonary rehab          Relevant Orders    Pulmonary function test    Referral to Pulmonary Rehabilitation

## 2023-10-30 NOTE — ASSESSMENT & PLAN NOTE
PFTs with restriction.   - continue symbicort twice daily - rinse mouth out afterwards   - continue albuterol 2 puffs or albuterol nebulizers every 4-6 hours as needed for shortness of breath   - will get PFTs - pre/post, 6MWT, and O2 desat

## 2023-10-30 NOTE — PATIENT INSTRUCTIONS
Abnormal CT of the chest    Asthma - Primary    Overview               Current Assessment & Plan     PFTs with restriction.   - continue symbicort twice daily - rinse mouth out afterwards   - continue albuterol 2 puffs or albuterol nebulizers every 4-6 hours as needed for shortness of breath   - will get PFTs - pre/post, 6MWT, and O2 desat          Relevant Medications    albuterol (Ventolin HFA) 90 mcg/actuation inhaler    budesonide-formoteroL (Symbicort) 160-4.5 mcg/actuation inhaler    Other Relevant Orders    Pulmonary function test    Hypoxia    Overview     6MWT in 2022 with 4L   - will get repeat O2 desat and 6MWT with next appt          Relevant Orders    Pulmonary function test    Referral to Pulmonary Rehabilitation    Restrictive lung disease    Current Assessment & Plan     Scarring on CT chest - stable for several years   - continue BiPAP at night with 3L   - continue to follow with sleep medicine   - referral to pulmonary rehab          Relevant Orders    Pulmonary function test    Referral to Pulmonary Rehabilitation       Thank you for visiting the Pulmonary clinic today!   Return to clinic 2-3 weeks with PFTs or sooner if needed   Yumiko Morataya CNP  My office number is [phone]  Zeinab is my  and Rowan is my nurse.   Radiology scheduling [phone]   Appointment scheduling [phone]

## 2023-10-31 ENCOUNTER — TELEPHONE (OUTPATIENT)
Dept: PULMONOLOGY | Facility: HOSPITAL | Age: 53
End: 2023-10-31
Payer: MEDICARE

## 2023-10-31 DIAGNOSIS — J44.9 CHRONIC OBSTRUCTIVE PULMONARY DISEASE, UNSPECIFIED COPD TYPE (MULTI): Primary | ICD-10-CM

## 2023-11-01 ENCOUNTER — TELEPHONE (OUTPATIENT)
Dept: PULMONOLOGY | Facility: HOSPITAL | Age: 53
End: 2023-11-01
Payer: MEDICARE

## 2023-11-01 NOTE — TELEPHONE ENCOUNTER
Patient explained that [facility] is no longer her DME but that [facility] is.  Faxed order for POC to [facility].  Fax confirmation received.

## 2023-11-03 ENCOUNTER — TELEPHONE (OUTPATIENT)
Dept: CARDIAC REHAB | Facility: CLINIC | Age: 53
End: 2023-11-03
Payer: MEDICARE

## 2023-11-14 ENCOUNTER — HOSPITAL ENCOUNTER (OUTPATIENT)
Dept: RESPIRATORY THERAPY | Facility: HOSPITAL | Age: 53
End: 2023-11-14
Payer: COMMERCIAL

## 2023-11-14 ENCOUNTER — APPOINTMENT (OUTPATIENT)
Dept: RESPIRATORY THERAPY | Facility: HOSPITAL | Age: 53
End: 2023-11-14
Payer: COMMERCIAL

## 2023-11-15 ENCOUNTER — TELEPHONE (OUTPATIENT)
Dept: PULMONOLOGY | Facility: HOSPITAL | Age: 53
End: 2023-11-15
Payer: MEDICARE

## 2023-11-15 ENCOUNTER — HOSPITAL ENCOUNTER (EMERGENCY)
Facility: HOSPITAL | Age: 53
Discharge: HOME | End: 2023-11-15
Attending: EMERGENCY MEDICINE
Payer: MEDICARE

## 2023-11-15 ENCOUNTER — TELEPHONE (OUTPATIENT)
Dept: PRIMARY CARE | Facility: HOSPITAL | Age: 53
End: 2023-11-15
Payer: MEDICARE

## 2023-11-15 ENCOUNTER — APPOINTMENT (OUTPATIENT)
Dept: RADIOLOGY | Facility: HOSPITAL | Age: 53
End: 2023-11-15
Payer: MEDICARE

## 2023-11-15 VITALS
WEIGHT: 173.6 LBS | HEART RATE: 97 BPM | RESPIRATION RATE: 17 BRPM | DIASTOLIC BLOOD PRESSURE: 87 MMHG | BODY MASS INDEX: 31.94 KG/M2 | SYSTOLIC BLOOD PRESSURE: 133 MMHG | OXYGEN SATURATION: 98 % | TEMPERATURE: 98 F | HEIGHT: 62 IN

## 2023-11-15 DIAGNOSIS — M10.9 ARTHRITIS DUE TO GOUT: Primary | ICD-10-CM

## 2023-11-15 LAB
ALBUMIN SERPL BCP-MCNC: 3.9 G/DL (ref 3.4–5)
ALP SERPL-CCNC: 103 U/L (ref 33–110)
ALT SERPL W P-5'-P-CCNC: 7 U/L (ref 7–45)
ANION GAP BLDV CALCULATED.4IONS-SCNC: 4 MMOL/L (ref 10–25)
ANION GAP SERPL CALC-SCNC: ABNORMAL MMOL/L
AST SERPL W P-5'-P-CCNC: 38 U/L (ref 9–39)
BASE EXCESS BLDV CALC-SCNC: 17.2 MMOL/L (ref -2–3)
BASOPHILS # BLD AUTO: 0.04 X10*3/UL (ref 0–0.1)
BASOPHILS NFR BLD AUTO: 0.7 %
BILIRUB SERPL-MCNC: 1 MG/DL (ref 0–1.2)
BODY TEMPERATURE: 37 DEGREES CELSIUS
BUN SERPL-MCNC: 19 MG/DL (ref 6–23)
CA-I BLDV-SCNC: 1 MMOL/L (ref 1.1–1.33)
CALCIUM SERPL-MCNC: 9.2 MG/DL (ref 8.6–10.3)
CHLORIDE BLDV-SCNC: 93 MMOL/L (ref 98–107)
CHLORIDE SERPL-SCNC: 87 MMOL/L (ref 98–107)
CO2 SERPL-SCNC: >45 MMOL/L (ref 21–32)
CREAT SERPL-MCNC: 0.98 MG/DL (ref 0.5–1.05)
CRP SERPL-MCNC: 1.24 MG/DL
EOSINOPHIL # BLD AUTO: 0.17 X10*3/UL (ref 0–0.7)
EOSINOPHIL NFR BLD AUTO: 2.8 %
ERYTHROCYTE [DISTWIDTH] IN BLOOD BY AUTOMATED COUNT: 14.7 % (ref 11.5–14.5)
ERYTHROCYTE [SEDIMENTATION RATE] IN BLOOD BY WESTERGREN METHOD: >130 MM/H (ref 0–30)
GFR SERPL CREATININE-BSD FRML MDRD: 69 ML/MIN/1.73M*2
GLUCOSE BLDV-MCNC: 82 MG/DL (ref 74–99)
GLUCOSE SERPL-MCNC: 87 MG/DL (ref 74–99)
HCO3 BLDV-SCNC: 44.5 MMOL/L (ref 22–26)
HCT VFR BLD AUTO: 39.6 % (ref 36–46)
HCT VFR BLD EST: 39 % (ref 36–46)
HGB BLD-MCNC: 12.8 G/DL (ref 12–16)
HGB BLDV-MCNC: 12.9 G/DL (ref 12–16)
IMM GRANULOCYTES # BLD AUTO: 0.02 X10*3/UL (ref 0–0.7)
IMM GRANULOCYTES NFR BLD AUTO: 0.3 % (ref 0–0.9)
INHALED O2 CONCENTRATION: 33 %
LACTATE BLDV-SCNC: 1.1 MMOL/L (ref 0.4–2)
LYMPHOCYTES # BLD AUTO: 1.12 X10*3/UL (ref 1.2–4.8)
LYMPHOCYTES NFR BLD AUTO: 18.7 %
MCH RBC QN AUTO: 31.8 PG (ref 26–34)
MCHC RBC AUTO-ENTMCNC: 32.3 G/DL (ref 32–36)
MCV RBC AUTO: 99 FL (ref 80–100)
MONOCYTES # BLD AUTO: 0.79 X10*3/UL (ref 0.1–1)
MONOCYTES NFR BLD AUTO: 13.2 %
NEUTROPHILS # BLD AUTO: 3.84 X10*3/UL (ref 1.2–7.7)
NEUTROPHILS NFR BLD AUTO: 64.3 %
NRBC BLD-RTO: 0 /100 WBCS (ref 0–0)
OXYHGB MFR BLDV: 80 % (ref 45–75)
PCO2 BLDV: 64 MM HG (ref 41–51)
PH BLDV: 7.45 PH (ref 7.33–7.43)
PLATELET # BLD AUTO: 183 X10*3/UL (ref 150–450)
PO2 BLDV: 53 MM HG (ref 35–45)
POTASSIUM BLDV-SCNC: 2.4 MMOL/L (ref 3.5–5.3)
POTASSIUM SERPL-SCNC: 3.6 MMOL/L (ref 3.5–5.3)
PROT SERPL-MCNC: 9.2 G/DL (ref 6.4–8.2)
RBC # BLD AUTO: 4.02 X10*6/UL (ref 4–5.2)
SAO2 % BLDV: 83 % (ref 45–75)
SODIUM BLDV-SCNC: 139 MMOL/L (ref 136–145)
SODIUM SERPL-SCNC: 138 MMOL/L (ref 136–145)
URATE SERPL-MCNC: 9.7 MG/DL (ref 2.3–6.7)
WBC # BLD AUTO: 6 X10*3/UL (ref 4.4–11.3)

## 2023-11-15 PROCEDURE — 84550 ASSAY OF BLOOD/URIC ACID: CPT | Performed by: EMERGENCY MEDICINE

## 2023-11-15 PROCEDURE — 73030 X-RAY EXAM OF SHOULDER: CPT | Mod: LEFT SIDE | Performed by: RADIOLOGY

## 2023-11-15 PROCEDURE — 73130 X-RAY EXAM OF HAND: CPT | Mod: RIGHT SIDE | Performed by: RADIOLOGY

## 2023-11-15 PROCEDURE — 36415 COLL VENOUS BLD VENIPUNCTURE: CPT | Performed by: EMERGENCY MEDICINE

## 2023-11-15 PROCEDURE — 73110 X-RAY EXAM OF WRIST: CPT | Mod: RT,FY,FR

## 2023-11-15 PROCEDURE — 84132 ASSAY OF SERUM POTASSIUM: CPT | Performed by: EMERGENCY MEDICINE

## 2023-11-15 PROCEDURE — 93971 EXTREMITY STUDY: CPT

## 2023-11-15 PROCEDURE — 86140 C-REACTIVE PROTEIN: CPT | Performed by: EMERGENCY MEDICINE

## 2023-11-15 PROCEDURE — 85652 RBC SED RATE AUTOMATED: CPT | Performed by: EMERGENCY MEDICINE

## 2023-11-15 PROCEDURE — 93971 EXTREMITY STUDY: CPT | Mod: FOREIGN READ | Performed by: RADIOLOGY

## 2023-11-15 PROCEDURE — 73130 X-RAY EXAM OF HAND: CPT | Mod: RT,FR

## 2023-11-15 PROCEDURE — 84295 ASSAY OF SERUM SODIUM: CPT | Performed by: EMERGENCY MEDICINE

## 2023-11-15 PROCEDURE — 73030 X-RAY EXAM OF SHOULDER: CPT | Mod: LT,FY,FR

## 2023-11-15 PROCEDURE — 80053 COMPREHEN METABOLIC PANEL: CPT | Performed by: EMERGENCY MEDICINE

## 2023-11-15 PROCEDURE — 85025 COMPLETE CBC W/AUTO DIFF WBC: CPT | Performed by: EMERGENCY MEDICINE

## 2023-11-15 PROCEDURE — 99284 EMERGENCY DEPT VISIT MOD MDM: CPT | Mod: 25

## 2023-11-15 PROCEDURE — 73110 X-RAY EXAM OF WRIST: CPT | Mod: RIGHT SIDE | Performed by: RADIOLOGY

## 2023-11-15 PROCEDURE — 99285 EMERGENCY DEPT VISIT HI MDM: CPT | Mod: 25 | Performed by: EMERGENCY MEDICINE

## 2023-11-15 RX ORDER — DICLOFENAC SODIUM 10 MG/G
4 GEL TOPICAL 4 TIMES DAILY
Qty: 10 G | Refills: 0 | Status: SHIPPED | OUTPATIENT
Start: 2023-11-15 | End: 2023-11-20 | Stop reason: WASHOUT

## 2023-11-15 RX ORDER — COLCHICINE 0.6 MG/1
0.6 TABLET ORAL 2 TIMES DAILY
Qty: 10 TABLET | Refills: 0 | Status: SHIPPED | OUTPATIENT
Start: 2023-11-15 | End: 2023-11-20

## 2023-11-15 RX ORDER — COLCHICINE 0.6 MG/1
0.6 TABLET ORAL ONCE
Status: DISCONTINUED | OUTPATIENT
Start: 2023-11-15 | End: 2023-11-15

## 2023-11-15 ASSESSMENT — COLUMBIA-SUICIDE SEVERITY RATING SCALE - C-SSRS
1. IN THE PAST MONTH, HAVE YOU WISHED YOU WERE DEAD OR WISHED YOU COULD GO TO SLEEP AND NOT WAKE UP?: NO
6. HAVE YOU EVER DONE ANYTHING, STARTED TO DO ANYTHING, OR PREPARED TO DO ANYTHING TO END YOUR LIFE?: NO
2. HAVE YOU ACTUALLY HAD ANY THOUGHTS OF KILLING YOURSELF?: NO

## 2023-11-15 ASSESSMENT — PAIN DESCRIPTION - PAIN TYPE: TYPE: ACUTE PAIN

## 2023-11-15 ASSESSMENT — PAIN DESCRIPTION - FREQUENCY: FREQUENCY: CONSTANT/CONTINUOUS

## 2023-11-15 ASSESSMENT — PAIN DESCRIPTION - LOCATION: LOCATION: HAND

## 2023-11-15 ASSESSMENT — PAIN - FUNCTIONAL ASSESSMENT: PAIN_FUNCTIONAL_ASSESSMENT: 0-10

## 2023-11-15 ASSESSMENT — PAIN DESCRIPTION - DESCRIPTORS: DESCRIPTORS: BURNING

## 2023-11-15 NOTE — TELEPHONE ENCOUNTER
Patient states she is experiencing sharp, numb and burning sensation in her right hand, she isn't having any use of that specific hand

## 2023-11-15 NOTE — TELEPHONE ENCOUNTER
"I received a notification that Ms. Treviño called the [facility] regarding \"sharp, numb and burning sensation in her R hand\". I called her to discuss.     She states that for around the past week or so she has had pain in her R hand. It is worse today along with some \"darkness and redness\", swelling, \"swelling in veins\", numbness/burning sensation, 10/10 pain. Regarding motor function she states \"I can barely close it\". Initially she tried putting creams and taking tylenol but this did not help her pain. It has worsened today. Unclear how long it has been swollen for, but the pain is definitely worse.     She does have a history of carpal tunnel and she states this feels different. She has hx of lymphedema on the L side as well, but that is at baseline. No hx lymphedema on the R per her (where the pain/swelling is). Without being able to examine in her in person today, I instructed her to go to the ED to be evaluated. She states that she will go to [facility], and had no further questions.    El Carranza MD  PGY-3 IM Resident  [facility]  "

## 2023-11-15 NOTE — TELEPHONE ENCOUNTER
Faxed the standard written order to renew the patient's oxygen to [facility]. Fax confirmation received.

## 2023-11-16 NOTE — ED PROVIDER NOTES
HPI   Chief Complaint   Patient presents with    Hand Pain     Pt arrived to ED with c/o right hand pain and swelling since Friday. Pt states that it is burning. Pt alert and oriented x4. Pt on 3 liters home O2. Pt denies injury to hand       This is a 53-year-old female who presents to the emergency department complaining of right hand pain and swelling.  The patient reports the symptoms has been present for the past 5 days.  The pain is worse with movement.  She denies any injury.  She denies trouble fever.  She reports that the hand feels warm at times.  She denies any redness.  She denies rash.  The patient also complains of pain to her left shoulder.  She states that her grandson threw a toy which hit her in the shoulder.                          Spenser Coma Scale Score: 15                  Patient History   Past Medical History:   Diagnosis Date    Acute atopic conjunctivitis, bilateral 08/28/2017    Allergic conjunctivitis of both eyes    Acute upper respiratory infection, unspecified 01/04/2017    URTI (acute upper respiratory infection)    Anesthesia of skin 06/19/2015    Numbness and tingling    Candidiasis of skin and nail 11/21/2013    Cutaneous candidiasis    Dietary folate deficiency anemia 06/22/2016    Anemia, macrocytic, nutritional    Dizziness and giddiness 12/20/2016    Lightheadedness    Encounter for screening for human immunodeficiency virus (HIV) 01/06/2016    Screening for HIV (human immunodeficiency virus)    Epilepsy, unspecified, not intractable, without status epilepticus (CMS/[facility]) 08/28/2017    Epilepsy    Hodgkin lymphoma, unspecified, unspecified site (CMS/[facility]) 07/27/2013    Hodgkin's disease    Localized edema 06/24/2016    Leg edema    Obstructive sleep apnea (adult) (pediatric) 09/12/2014    ENRIQUE on CPAP    Other polyuria 07/06/2016    Diuresis    Other specified cough 05/01/2018    Cough with expectoration    Other specified disorders of the skin and subcutaneous tissue 09/12/2014     Skin plaque    Pain in left shoulder 05/21/2015    Left shoulder pain    Pain in unspecified ankle and joints of unspecified foot 11/18/2015    Ankle pain    Pain in unspecified foot 11/19/2015    Foot pain    Personal history of diseases of the blood and blood-forming organs and certain disorders involving the immune mechanism 01/12/2016    History of macrocytic anemia    Personal history of Hodgkin lymphoma 06/08/2022    History of Hodgkin's lymphoma    Personal history of non-Hodgkin lymphomas 03/09/2017    History of lymphoma    Personal history of other diseases of the circulatory system 10/25/2013    History of hypertension    Personal history of other diseases of the respiratory system 01/15/2018    History of acute bronchitis    Personal history of other diseases of the respiratory system 09/12/2014    Personal history of asthma    Personal history of other diseases of the respiratory system 08/24/2016    History of allergic rhinitis    Personal history of other endocrine, nutritional and metabolic disease 11/28/2017    History of obesity    Personal history of other endocrine, nutritional and metabolic disease 12/20/2016    History of fluid overload    Personal history of other specified conditions 08/24/2016    History of shortness of breath    Personal history of other specified conditions 11/30/2015    History of wheezing    Personal history of other specified conditions 08/28/2017    History of headache    Personal history of other specified conditions 07/11/2017    History of chest pain    Personal history of other specified conditions 06/22/2016    History of fatigue    Personal history of pneumonia (recurrent) 01/12/2016    History of pneumonia    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    Lymphedema syndrome, postmastectomy    Right upper quadrant abdominal tenderness 02/23/2016    Right upper quadrant abdominal tenderness    Unspecified amblyopia, left eye     Amblyopia, left eye     Unspecified disorder of binocular vision 08/28/2017    Binocular visual disturbance     Past Surgical History:   Procedure Laterality Date    HYSTERECTOMY  11/28/2017    Hysterectomy    MASTECTOMY  07/16/2014    Breast Surgery Mastectomy     Family History   Problem Relation Name Age of Onset    Other (age macular degneration) Mother      Breast cancer Mother      Coronary artery disease Mother      Glaucoma Mother      Hypertension Mother      Heart attack Father          acute    Colon cancer Father      Coronary artery disease Father      Diabetes Father      Glaucoma Father      Heart attack Sister          acute    Hypertension Sister      Multiple sclerosis Sister      Hypertension Brother      Amblyopia Son       Social History     Tobacco Use    Smoking status: Never    Smokeless tobacco: Never   Vaping Use    Vaping Use: Never used   Substance Use Topics    Alcohol use: Never    Drug use: Never       Physical Exam   ED Triage Vitals [11/15/23 1804]   Temp Heart Rate Resp BP   36.7 °C (98 °F) 97 17 117/75      SpO2 Temp Source Heart Rate Source Patient Position   96 % Temporal Monitor Sitting      BP Location FiO2 (%)     Right arm --       Physical Exam  Vitals and nursing note reviewed.   HENT:      Head: Normocephalic and atraumatic.      Nose: Nose normal.   Eyes:      Conjunctiva/sclera: Conjunctivae normal.   Cardiovascular:      Rate and Rhythm: Normal rate and regular rhythm.      Pulses: Normal pulses.      Heart sounds: Normal heart sounds.   Pulmonary:      Effort: Pulmonary effort is normal.      Breath sounds: Normal breath sounds.   Abdominal:      General: Bowel sounds are normal.      Palpations: Abdomen is soft.   Musculoskeletal:         General: Normal range of motion.      Left shoulder: Tenderness present.      Right wrist: Swelling present.      Cervical back: Normal range of motion and neck supple.      Comments: Less than 2-second capillary refill in the fingers.  Normal sensation in  the hand.   Skin:     Findings: No rash.   Neurological:      General: No focal deficit present.      Mental Status: She is alert and oriented to person, place, and time.   Psychiatric:         Mood and Affect: Mood normal.         ED Course & MDM   Diagnoses as of 11/15/23 2145   Arthritis due to gout       Medical Decision Making  Differential diagnosis considered: Arthritis, cellulitis, septic joint, DVT, strain, fracture    This is a 53-year-old female who presents to the emergency department with right wrist and hand swelling and pain.  There is no rash or redness to the joint.  There is swelling at the wrist.  The patient was further evaluated with labs, x-rays and ultrasound of the arm.  The ultrasound showed no DVT.  X-ray showed mild swelling but no fractures.  X-rays of the left shoulder showed old injury.  This was discussed with the patient and she reports old injuries and dislocation of the shoulder.  Labs show an elevated uric acid and inflammatory marker.  Her symptoms are likely due to gout.  Discussed treatment with colchicine.  The patient would prefer to wait and taking oral medications.  She requested a cream.  She was ordered Voltaren cream.  She was placed into a wrist immobilizer for comfort.  A prescription for colchicine was sent to her pharmacy for the patient to try if she changes her mind.  She will follow-up with her primary physician.    Amount and/or Complexity of Data Reviewed  Labs: ordered. Decision-making details documented in ED Course.  Radiology: ordered. Decision-making details documented in ED Course.        Procedure  Procedures     Alex Guy MD  11/15/23 0241

## 2023-11-16 NOTE — ED TRIAGE NOTES
HPI   Chief Complaint   Patient presents with    Hand Pain     Pt arrived to ED with c/o right hand pain and swelling since Friday. Pt states that it is burning. Pt alert and oriented x4. Pt on 3 liters home O2. Pt denies injury to hand       Patient is a 53-year-old female with past medical history significant for rheumatoid arthritis, remote lung and breast cancer with resulting lymphadenitis in the left upper extremity who presents emergency room for right hand pain and swelling extending up to the wrist.  She states that for the past 2 weeks she has noted pain and burning of the entirety of her right hand.  She has a history of carpal tunnel syndrome but states that this is different and the pain extends from front to back through the entirety of the hand.  She has also noted that it has some slight swelling up to the wrist.  She noted a few days ago the anterior aspect of the wrist was red but this has resolved.  She has full range of motion of the wrist without significant pain.  She mostly feels burning and discomfort of the whole hand and has trouble making a fist.  She called her primary care doctor who told her to be evaluated.  She denies any direct trauma to the hand, bites, rashes or other symptoms.  Of note, she does have left shoulder pain after her grandson threw a toy directly at the shoulder 2 weeks ago as well.  She did not seek care but feels discomfort in the anterior aspect of the left shoulder still.                          No data recorded                Patient History   Past Medical History:   Diagnosis Date    Acute atopic conjunctivitis, bilateral 08/28/2017    Allergic conjunctivitis of both eyes    Acute upper respiratory infection, unspecified 01/04/2017    URTI (acute upper respiratory infection)    Anesthesia of skin 06/19/2015    Numbness and tingling    Candidiasis of skin and nail 11/21/2013    Cutaneous candidiasis    Dietary folate deficiency anemia 06/22/2016    Anemia,  macrocytic, nutritional    Dizziness and giddiness 12/20/2016    Lightheadedness    Encounter for screening for human immunodeficiency virus (HIV) 01/06/2016    Screening for HIV (human immunodeficiency virus)    Epilepsy, unspecified, not intractable, without status epilepticus (CMS/[facility]) 08/28/2017    Epilepsy    Hodgkin lymphoma, unspecified, unspecified site (CMS/[facility]) 07/27/2013    Hodgkin's disease    Localized edema 06/24/2016    Leg edema    Obstructive sleep apnea (adult) (pediatric) 09/12/2014    ENRIQUE on CPAP    Other polyuria 07/06/2016    Diuresis    Other specified cough 05/01/2018    Cough with expectoration    Other specified disorders of the skin and subcutaneous tissue 09/12/2014    Skin plaque    Pain in left shoulder 05/21/2015    Left shoulder pain    Pain in unspecified ankle and joints of unspecified foot 11/18/2015    Ankle pain    Pain in unspecified foot 11/19/2015    Foot pain    Personal history of diseases of the blood and blood-forming organs and certain disorders involving the immune mechanism 01/12/2016    History of macrocytic anemia    Personal history of Hodgkin lymphoma 06/08/2022    History of Hodgkin's lymphoma    Personal history of non-Hodgkin lymphomas 03/09/2017    History of lymphoma    Personal history of other diseases of the circulatory system 10/25/2013    History of hypertension    Personal history of other diseases of the respiratory system 01/15/2018    History of acute bronchitis    Personal history of other diseases of the respiratory system 09/12/2014    Personal history of asthma    Personal history of other diseases of the respiratory system 08/24/2016    History of allergic rhinitis    Personal history of other endocrine, nutritional and metabolic disease 11/28/2017    History of obesity    Personal history of other endocrine, nutritional and metabolic disease 12/20/2016    History of fluid overload    Personal history of other specified conditions 08/24/2016     History of shortness of breath    Personal history of other specified conditions 11/30/2015    History of wheezing    Personal history of other specified conditions 08/28/2017    History of headache    Personal history of other specified conditions 07/11/2017    History of chest pain    Personal history of other specified conditions 06/22/2016    History of fatigue    Personal history of pneumonia (recurrent) 01/12/2016    History of pneumonia    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    Lymphedema syndrome, postmastectomy    Right upper quadrant abdominal tenderness 02/23/2016    Right upper quadrant abdominal tenderness    Unspecified amblyopia, left eye     Amblyopia, left eye    Unspecified disorder of binocular vision 08/28/2017    Binocular visual disturbance     Past Surgical History:   Procedure Laterality Date    HYSTERECTOMY  11/28/2017    Hysterectomy    MASTECTOMY  07/16/2014    Breast Surgery Mastectomy     Family History   Problem Relation Name Age of Onset    Other (age macular degneration) Mother      Breast cancer Mother      Coronary artery disease Mother      Glaucoma Mother      Hypertension Mother      Heart attack Father          acute    Colon cancer Father      Coronary artery disease Father      Diabetes Father      Glaucoma Father      Heart attack Sister          acute    Hypertension Sister      Multiple sclerosis Sister      Hypertension Brother      Amblyopia Son       Social History     Tobacco Use    Smoking status: Never    Smokeless tobacco: Never   Vaping Use    Vaping Use: Never used   Substance Use Topics    Alcohol use: Never    Drug use: Never       Physical Exam   ED Triage Vitals [11/15/23 1804]   Temp Heart Rate Resp BP   36.7 °C (98 °F) 97 17 117/75      SpO2 Temp Source Heart Rate Source Patient Position   96 % Temporal Monitor Sitting      BP Location FiO2 (%)     Right arm --       Physical Exam  Female presents with a oxygen tank which is her baseline owing to  chronic lung issues.  Lungs are clear and heart is regular.  Right hand shows some very slight swelling compared to the left without any warmth, erythema to any part of the hand, wrist or joints.  She does have trouble making a fist owing to diffuse pain.  No sensory deficits on my exam but does state that she feels occasional numbness.  Remainder of arm is unremarkable.  She has tenderness to palpation to the anterior aspect of the left shoulder.    ED Course & MDM    Patient seen and evaluated as provider in triage for new onset right hand pain and swelling.  No signs of warmth, erythema on exam and she has mild swelling up to the wrist with good perfusion.  No signs of septic joint.  Left shoulder with decreased range of motion and tenderness over patient anterior aspect.  We will obtain lab work and order imaging, ultrasound to evaluate further.        Procedure  Procedures

## 2023-11-20 ENCOUNTER — HOSPITAL ENCOUNTER (OUTPATIENT)
Dept: RESPIRATORY THERAPY | Facility: HOSPITAL | Age: 53
Discharge: HOME | End: 2023-11-20
Payer: MEDICARE

## 2023-11-20 ENCOUNTER — OFFICE VISIT (OUTPATIENT)
Dept: PULMONOLOGY | Facility: HOSPITAL | Age: 53
End: 2023-11-20
Payer: MEDICARE

## 2023-11-20 VITALS
OXYGEN SATURATION: 93 % | SYSTOLIC BLOOD PRESSURE: 93 MMHG | HEART RATE: 101 BPM | DIASTOLIC BLOOD PRESSURE: 66 MMHG | BODY MASS INDEX: 32.19 KG/M2 | TEMPERATURE: 98.2 F | WEIGHT: 176 LBS | RESPIRATION RATE: 22 BRPM

## 2023-11-20 DIAGNOSIS — J98.4 RESTRICTIVE LUNG DISEASE: ICD-10-CM

## 2023-11-20 DIAGNOSIS — J96.11 CHRONIC RESPIRATORY FAILURE WITH HYPOXIA (MULTI): ICD-10-CM

## 2023-11-20 DIAGNOSIS — R93.89 ABNORMAL CT OF THE CHEST: ICD-10-CM

## 2023-11-20 DIAGNOSIS — R09.02 HYPOXIA: ICD-10-CM

## 2023-11-20 DIAGNOSIS — J45.909 MILD ASTHMA, UNSPECIFIED WHETHER COMPLICATED, UNSPECIFIED WHETHER PERSISTENT (HHS-HCC): ICD-10-CM

## 2023-11-20 DIAGNOSIS — J45.909 MILD ASTHMA, UNSPECIFIED WHETHER COMPLICATED, UNSPECIFIED WHETHER PERSISTENT (HHS-HCC): Primary | ICD-10-CM

## 2023-11-20 DIAGNOSIS — J45.909: ICD-10-CM

## 2023-11-20 PROCEDURE — 3078F DIAST BP <80 MM HG: CPT | Performed by: NURSE PRACTITIONER

## 2023-11-20 PROCEDURE — 99214 OFFICE O/P EST MOD 30 MIN: CPT | Performed by: NURSE PRACTITIONER

## 2023-11-20 PROCEDURE — 3008F BODY MASS INDEX DOCD: CPT | Performed by: NURSE PRACTITIONER

## 2023-11-20 PROCEDURE — 99214 OFFICE O/P EST MOD 30 MIN: CPT | Mod: 25 | Performed by: NURSE PRACTITIONER

## 2023-11-20 PROCEDURE — 94618 PULMONARY STRESS TESTING: CPT

## 2023-11-20 PROCEDURE — 1036F TOBACCO NON-USER: CPT | Performed by: NURSE PRACTITIONER

## 2023-11-20 PROCEDURE — 3074F SYST BP LT 130 MM HG: CPT | Performed by: NURSE PRACTITIONER

## 2023-11-20 ASSESSMENT — ENCOUNTER SYMPTOMS
RHINORRHEA: 0
NAUSEA: 0
WEAKNESS: 0
UNEXPECTED WEIGHT CHANGE: 1
EYE PAIN: 0
PALPITATIONS: 1
VOICE CHANGE: 0
VOMITING: 0
FEVER: 1
ARTHRALGIAS: 0
HEADACHES: 0
NUMBNESS: 0
MYALGIAS: 0
JOINT SWELLING: 0
ABDOMINAL PAIN: 0
SHORTNESS OF BREATH: 1
AGITATION: 0
SINUS PRESSURE: 0
FATIGUE: 1
DIARRHEA: 0
NERVOUS/ANXIOUS: 0
DIZZINESS: 0
BACK PAIN: 0

## 2023-11-20 ASSESSMENT — PAIN SCALES - GENERAL: PAINLEVEL: 5

## 2023-11-20 NOTE — PATIENT INSTRUCTIONS
Asthma.  PFTs with restriction   - continue symbicort twice daily - rinse mouth out afterwards   - continue albuterol 2 puffs or albuterol nebulizers every 4-6 hours as needed for shortness of breath   - will get PFTs - pre/post, 6MWT with next appt     Hypoxia:   - continue oxygen as needed with exertion during the day and at night with BiPAP     Restrictive lung disease/ Abnormal CT of the chest: CT chest with scarring - stable for several years   - continue BiPAP at night with 3L   - continue to follow with sleep medicine   - referral to pulmonary rehab      ENRIQUE: on BIPAP at night with 3L   - make follow up appt with sleep      Thank you for visiting the Pulmonary clinic today!   Return to clinic  3 months after breathing tests or sooner if needed   Yumiko Morataya CNP  My office number is [phone]  Zeinab is my  and Rowan is my nurse.   Radiology scheduling [phone]   Appointment scheduling [phone]

## 2023-11-20 NOTE — PROGRESS NOTES
Patient: Rosita Treviño    [id number]  : 1970 -- AGE 53 y.o.    Provider: BAN Lozano-CNP     Location [facility]   Service Date: 2023              [facility]  Follow up visit note      HISTORY OF PRESENT ILLNESS     DME: Marika   Cardiology: Dr. Merino/ Dr. Hilton     HISTORY OF PRESENT ILLNESS     Since last visit she has been ok. She has been using symbicort twice  - rarely uses her albuterol. She has had issues where she lives with the boiler - keeps her unit too hot.  She is moving into a new apartment soon. They keep turning her water on/off. She has also had issues with neighbors smoking.  She feels her fluid status is doing much better. She does have a pulse ox at home - she states its normally is in the 90s on her oxygen. She has been using her oxygen at 3L continuously. She states she takes it off at times to get some fresh air. O2 desat today showed she does still need 2L with exertion. She states she has lost a lot of weight - states as her weight goes down the better she has been feeling. She has been working on eating less to try and lose weight. She as a been coughing - dry cough. She states normally related to exposures - smoke or cold air. She denies any wheezing, SOB at rest, or GERD. She has MODI if she does not wear her oxygen - she states if she does not wear her oxygen she feels weak and has palpitations. She got a little dizzy with walking. She has some seasonal allergies - takes claritin PRN, but has not needed any recently.  She has intermittent chest pains - less bothersome than previous. She states she is not concerned about it. She has been wearing her BIPAP with 3L at night. She has been in pulmonary rehab before - she has to check with her insurance to check how many more sessions she has.     23: She states she was living in [place] and moved back to [place]. She was recently admitted in  9/2023 for CHF exacerbation and fluid overload. She states her weight has been going up/ down - states she has been losing weight 198 - > 183 -> 181 -> 194. She feels she is having a little LE swelling and thinks this may be related to fluid.  She is frustrated related to her fluid status - has been watching her fluid and salt intake. She uses her symbicort twice daily and PRN albuterol not often. She has been using her albuterol nebulizers rarely - she had hospital at home and they recommended, but she did not feel like she needed it. She feels the symbicort works especially with the spacer. She feels she is filling up with fluid and has increased SOB and heaviness in her chest. She is tired of her oxygen - she would like to get rid of it if she could. She did not taker her torsemide today -- waiting until after she goes home. She has dry cough. She previously had thick clear mucous - lately more dry. She is triggered by smells - cleaning products and smoke. She will notice wheezing  - rarely uses the albuterol so not sure if it helps. She has MODI when she is holding onto fluid - states once she goes home and takes her torsemide she feels her breathing is much better. She will have some SOB at rest when her fluid is up. She has runny nose, post nasal drip, and sneezing. She denies any congestion. She states dust makes her sneeze.  She has taken claritin in the past - feels it helps her. She denies any GERD. She denies any chest pain - more chest pressure from her fluid status. She will at times get phantom pain from her incisions in her chest. She uses ENRIQUE - on BiPAP and 3L.     Dr. Jim - 7/7/22 - 51 year old female with history of CHF, seizure disorder), chronic respiratory failure 2/2   asthma and COPD (baseline use 3L O2 PRN on NC at night) Hodgkin's lymphoma in remission, L BRCA s/p mastectomy in remission, ENRIQUE, and HTN  - She is here for pulmonary evaluation    - Previously seen by Dr. Sy in 2018 for her  asthma    Was recently admitted for encephalopathy/acute on chronic respiratory failure from May 11-18 , treated for chf/asthma exacerbation  Prior to this she had another episode of pulmonary edema prior to moving back to [place]    Was in [place] for 3 years--just moved back in May 2022   Previously been to pulmonary rehab    Is using BiPAP at night --has been using every night    Seasonal allergies    Pulm meds: symbicort, albuterol     ALLERGIES AND MEDICATIONS     ALLERGIES  Allergies   Allergen Reactions    House Dust Cough    Iodinated Contrast Media Itching    Irbesartan Unknown    Salicylates Unknown    Aspirin Rash    Gadolinium-Containing Contrast Media Rash    Penicillins Rash and Hives       MEDICATIONS  Current Outpatient Medications   Medication Sig Dispense Refill    albuterol (Ventolin HFA) 90 mcg/actuation inhaler Inhale 2 puffs every 4 hours if needed for wheezing or shortness of breath. 18 g 3    albuterol 0.63 mg/3 mL nebulizer solution Take 3 mL (0.63 mg) by nebulization every 4 hours if needed.      budesonide-formoteroL (Symbicort) 160-4.5 mcg/actuation inhaler Inhale 2 puffs 2 times a day. 1 each 11    colchicine 0.6 mg tablet Take 1 tablet (0.6 mg) by mouth 2 times a day for 5 days. 10 tablet 0    cyanocobalamin (Vitamin B-12) 1,000 mcg tablet Take 1 tablet (1,000 mcg) by mouth once daily.      dapagliflozin propanediol (Farxiga) 10 mg TAKE 1 TABLET BY MOUTH DAILY 5 tablet 0    ipratropium-albuteroL (Duo-Neb) 0.5-2.5 mg/3 mL nebulizer solution USE 1 VIAL IN NEBULIZER EVERY 8 HOURS AS DIRECTED 45 mL 0    levETIRAcetam (Keppra) 500 mg tablet Take 1 tablet (500 mg) by mouth 2 times a day.      metOLazone (Zaroxolyn) 5 mg tablet Take 1 tablet (5 mg) by mouth 1 (one) time per week in the early morning..      metoprolol succinate XL (Toprol-XL) 100 mg 24 hr tablet Take 1 tablet (100 mg) by mouth once daily.      oxygen (O2) therapy 3L BiPAP BLEED NIGHTLY      potassium chloride CR 20 mEq ER  tablet Take 1 tablet (20 mEq) by mouth 2 times a day.      torsemide (Demadex) 20 mg tablet Take 2 tablets (40 mg) by mouth 2 times a day. 120 tablet 11    acetaminophen-pamabrom 325-25 mg tablet Take 325 tablets by mouth every 4 hours if needed.      ammonium lactate (Amlactin) 12 % cream Apply 385 Applications topically 2 times a day.      diclofenac sodium (Voltaren Arthritis Pain) 1 % gel gel Apply 1 Application topically 4 times a day. (Patient not taking: Reported on 11/20/2023) 10 g 0    docusate sodium (Colace) 100 mg capsule TAKE 1 CAPSULE BY MOUTH TWO TIMES A DAY AS NEEDED (Patient not taking: Reported on 11/20/2023) 10 capsule 0     No current facility-administered medications for this visit.         PAST HISTORY     PAST MEDICAL HISTORY  - HFpEF   - seizure disorder   - COPD/ asthma - elevated right hemidiaphragm   - breast cancer (left) s/p mastectomy- 2015 - in remission   - Hodgkin's lymphoma - 1993 - s/p chemo / radiation (mediastinum) - in remission   - ENRIQUE - on BiPAP and 3L   - hysterectomy  - ? Hernia     PAST SURGICAL HISTORY  Past Surgical History:   Procedure Laterality Date    HYSTERECTOMY  11/28/2017    Hysterectomy    MASTECTOMY  07/16/2014    Breast Surgery Mastectomy       IMMUNIZATION HISTORY  Immunization History   Administered Date(s) Administered    Influenza, Unspecified 10/19/2011, 10/02/2012    Influenza, seasonal, injectable 12/02/2015, 12/02/2015    Influenza, seasonal, injectable, preservative free 09/30/2013    Pneumococcal polysaccharide vaccine, 23-valent, age 2 years and older (PNEUMOVAX 23) 12/13/2013    Tdap vaccine, age 7 year and older (BOOSTRIX) 04/19/2016       SOCIAL HISTORY  She  reports that she has never smoked. She has never used smokeless tobacco. She reports that she does not drink alcohol and does not use drugs.     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       FAMILY HISTORY  Family History   Problem Relation Name Age of Onset    Other (age macular degneration) Mother      Breast cancer Mother      Coronary artery disease Mother      Glaucoma Mother      Hypertension Mother      Heart attack Father          acute    Colon cancer Father      Coronary artery disease Father      Diabetes Father      Glaucoma Father      Heart attack Sister          acute    Hypertension Sister      Multiple sclerosis Sister      Hypertension Brother      Amblyopia Son       Several family members- asthma/ COPD - all maternal uncles and mother with COPD     RESULTS/DATA     Pulmonary Function Test Results       PFTs:   1/31/18 - FEV1/ FVC 0.75/ FEV1 2.22 (55%)/ FVC 2.74 (60%)  8/11/22 - FEV1/FVC 0.72/ FEV1 0.89 (40% - no BD resp)/ FVC 1.16 (43%)/ TLC 3.19 (76%)/ DLCO 61%     6MWT:   9/9/22 - 303m () 88 -> 96 on 4L     O2 desat: 11/20/23 - 92% on RA at rest -> 85% with exertion. 98% at rest on 2L -> 93% with exertion     Cardiopulmonary exercise test - 7/24/14 - abnormal cardiopulmonary exercise test. Findings are most concistent with a circulatory impairment and the probably the condition. The pulmnoary vascular component cannot be excluded.       Chest Radiograph     XR chest 1 view 07/01/2023- The lungs are clear.  No pneumothorax or effusion is evident. The  cardiomediastinal silhouette is  not enlarged.Degenerative change is  seen of the spine and shoulders. No acute cardiopulmonary process is evident.      Chest CT Scan     CT chest:   Multiple previous - oldest 4/7/18 - Stable radiation related changes in the bilateral upper lobe, additionally scarring/chronic lung changes in the right lower lobe posteromedially. A new 4 mm noncalcified nodule in the superior segment of the left lower lobe, attention warranted in follow-up imaging. Stable 3 mm nodule in the middle lobe since to prior exam, may likely represent benign. Calcified, mediastinal lymph node, unchanged, no  suspicious adenopathy.   5/15/22 - No evidence of acute pulmonary embolism. 2. Stable postradiation therapy fibrotic changes in the bilateral lung apices and posteromedial right lower lobe. No new airspace consolidation, pleural effusion or pneumothorax. 3. Additional stable chronic findings as above.   9/4/23 -IMPRESSION: No acute cardiopulmonary process. Suggestion of distal gastric wall thickening. Please correlate for symptoms of gastritis. Mild ascites. Mild body wall edema/anasarca. Please correlate with physical examination to exclude cellulitis of the lower abdominal wall.       Echocardiogram     Echo: 9/29/23 - Left ventricular systolic function is normal with a 60-65% estimated ejection fraction.  2. There is mildly reduced right ventricular systolic function.  3. Moderate to severe mitral valve regurgitation.  4. There is moderately restricted tricuspid valve leaflet mobility.  5. Moderately elevated right ventricular systolic pressure.  6. Severe tricuspid regurgitation visualized.  7. There is malcoaptation of the TV leaflets and central tricuspid regurgitation.  8. Mild aortic valve regurgitation.  RA normal size, RV mildly enlarged - mildly reduced RV systolic fnction     REVIEW OF SYSTEMS     REVIEW OF SYSTEMS  Review of Systems   Constitutional:  Positive for fatigue, fever and unexpected weight change.   HENT:  Negative for congestion, postnasal drip, rhinorrhea, sinus pressure and voice change.    Eyes:  Negative for pain and visual disturbance.   Respiratory:  Positive for shortness of breath.    Cardiovascular:  Positive for chest pain and palpitations. Negative for leg swelling.   Gastrointestinal:  Negative for abdominal pain, diarrhea, nausea and vomiting.   Endocrine: Negative for cold intolerance and heat intolerance.   Musculoskeletal:  Negative for arthralgias, back pain, joint swelling and myalgias.   Skin:  Negative for rash.   Neurological:  Negative for dizziness, weakness, numbness  and headaches.   Psychiatric/Behavioral:  Negative for agitation. The patient is not nervous/anxious.          PHYSICAL EXAM     VITAL SIGNS: There were no vitals taken for this visit.     CURRENT WEIGHT: [unfilled]  BMI: [unfilled]  PREVIOUS WEIGHTS:  Wt Readings from Last 3 Encounters:   11/15/23 78.7 kg (173 lb 9.6 oz)   10/30/23 88 kg (194 lb)   10/10/23 89.4 kg (197 lb)       Physical Exam  Vitals reviewed.   Constitutional:       General: She is not in acute distress.     Appearance: Normal appearance. She is not ill-appearing or toxic-appearing.   HENT:      Head: Normocephalic.      Nose: No rhinorrhea.   Cardiovascular:      Rate and Rhythm: Normal rate and regular rhythm.      Heart sounds: Murmur heard.   Pulmonary:      Effort: Pulmonary effort is normal. No respiratory distress.      Breath sounds: Normal breath sounds. No stridor.      Comments: Right base diminished   Abdominal:      General: Abdomen is flat.   Musculoskeletal:         General: Swelling present. Normal range of motion.      Comments: Trace LE edema    Skin:     General: Skin is warm and dry.      Nails: There is no clubbing.   Neurological:      General: No focal deficit present.      Mental Status: She is alert.   Psychiatric:         Mood and Affect: Mood normal.         Behavior: Behavior normal.         Judgment: Judgment normal.         ASSESSMENT/PLAN     Asthma.  PFTs with restriction   - continue symbicort twice daily - rinse mouth out afterwards   - continue albuterol 2 puffs or albuterol nebulizers every 4-6 hours as needed for shortness of breath   - will get PFTs - pre/post, 6MWT with next appt     Hypoxia:   - continue oxygen as needed with exertion during the day and at night with BiPAP     Restrictive lung disease/ Abnormal CT of the chest: CT chest with scarring - stable for several years   - continue BiPAP at night with 3L   - continue to follow with sleep medicine   - referral to pulmonary rehab      ENRIQUE: on BIPAP at  night with 3L   - make follow up appt with sleep

## 2023-11-22 ENCOUNTER — HOSPITAL ENCOUNTER (OUTPATIENT)
Dept: RESPIRATORY THERAPY | Facility: HOSPITAL | Age: 53
End: 2023-11-22
Payer: MEDICARE

## 2023-11-22 ENCOUNTER — APPOINTMENT (OUTPATIENT)
Dept: RESPIRATORY THERAPY | Facility: HOSPITAL | Age: 53
End: 2023-11-22
Payer: MEDICARE

## 2023-11-22 NOTE — PROGRESS NOTES
Rosita Treviño is a 53 y.o. female on day 0 of admission presenting with No Principal Problem: There is no principal problem currently on the Problem List. Please update the Problem List and refresh..    Subjective          Objective     Physical Exam    Last Recorded Vitals  There were no vitals taken for this visit.  Intake/Output last 3 Shifts:  No intake/output data recorded.    Relevant Results                             Assessment/Plan              Andreia Jacobo, RRT

## 2023-11-27 ENCOUNTER — HOSPITAL ENCOUNTER (OUTPATIENT)
Dept: RESPIRATORY THERAPY | Facility: HOSPITAL | Age: 53
Discharge: HOME | End: 2023-11-27
Payer: MEDICARE

## 2023-11-27 DIAGNOSIS — J98.4 RESTRICTIVE LUNG DISEASE: ICD-10-CM

## 2023-11-27 DIAGNOSIS — J45.909 MILD ASTHMA, UNSPECIFIED WHETHER COMPLICATED, UNSPECIFIED WHETHER PERSISTENT (HHS-HCC): ICD-10-CM

## 2023-11-27 DIAGNOSIS — R09.02 HYPOXIA: ICD-10-CM

## 2023-11-27 LAB
MGC ASCENT PFT - FEV1 - POST: 1.13
MGC ASCENT PFT - FEV1 - PRE: 1.08
MGC ASCENT PFT - FEV1 - PREDICTED: 2.37
MGC ASCENT PFT - FVC - POST: 1.67
MGC ASCENT PFT - FVC - PRE: 1.63
MGC ASCENT PFT - FVC - PREDICTED: 2.9

## 2023-11-27 PROCEDURE — 94618 PULMONARY STRESS TESTING: CPT

## 2023-12-07 ENCOUNTER — DOCUMENTATION (OUTPATIENT)
Dept: INTENSIVE CARE | Facility: HOSPITAL | Age: 53
End: 2023-12-07
Payer: MEDICARE

## 2023-12-07 ENCOUNTER — TELEPHONE (OUTPATIENT)
Dept: PRIMARY CARE | Facility: HOSPITAL | Age: 53
End: 2023-12-07
Payer: MEDICARE

## 2023-12-07 NOTE — TELEPHONE ENCOUNTER
Patient called stating she is  having problems with both hands, She states she is experiencing gout pain in her hands. She states she was prescribed a medication in the ED ( Colchicine). She is asking for what she should do. Please call and address these concerns      ** Please sign encounter when completed.

## 2023-12-07 NOTE — PROGRESS NOTES
Received a box task regarding Ms. Treviño having bilateral hand pain. I called her and she stated that she recently went to the ED for similar concerns, at that time they gave her colchicine and she stated that her symptoms improved until she drank an oatmeal smoothie yesterday and her symptoms flared again. She states that her hands feel cold, but is able to move her fingers. She states that she is having bilateral hand pain, in particular she is having a hard time bending her fingers, her knuckles are swollen, some of her fingers feel numb. She states that she also has electrifying pain from her elbows to her fingers on her R arm. I told her that she should be seen by a provider as soon as possible. If she is able to get into [facility] in the next couple of days and she is able to tolerate the pain, then that is fine, otherwise I would recommend going to an urgent care or ED for quicker symptom relief. She is currently using tylenol arthritis, so I told her that she can continue using that as it does give her temporary relief. I also recommended heating pads.

## 2023-12-08 ENCOUNTER — TELEPHONE (OUTPATIENT)
Dept: CARDIAC REHAB | Facility: CLINIC | Age: 53
End: 2023-12-08
Payer: MEDICARE

## 2024-01-02 ENCOUNTER — TELEPHONE (OUTPATIENT)
Dept: PRIMARY CARE | Facility: HOSPITAL | Age: 54
End: 2024-01-02
Payer: MEDICARE

## 2024-01-02 DIAGNOSIS — R56.9 SEIZURE (MULTI): Primary | ICD-10-CM

## 2024-01-02 NOTE — TELEPHONE ENCOUNTER
Emma faxed in a request for refills on Metoprolol ER and Levetiracetam for the patient, please and thank you!

## 2024-01-03 DIAGNOSIS — I50.30 HEART FAILURE WITH PRESERVED EJECTION FRACTION, UNSPECIFIED HF CHRONICITY (MULTI): Primary | ICD-10-CM

## 2024-01-03 RX ORDER — METOPROLOL SUCCINATE 100 MG/1
100 TABLET, EXTENDED RELEASE ORAL DAILY
Qty: 90 TABLET | Refills: 3 | Status: SHIPPED | OUTPATIENT
Start: 2024-01-03

## 2024-01-03 RX ORDER — LEVETIRACETAM 500 MG/1
500 TABLET ORAL 2 TIMES DAILY
Qty: 180 TABLET | Refills: 3 | Status: SHIPPED | OUTPATIENT
Start: 2024-01-03 | End: 2024-12-28

## 2024-01-03 NOTE — TELEPHONE ENCOUNTER
Seizure med refilled, unsure about metoprolol whether pt is on it or not, will need to review meds that pt has. Scheduled an appt with us on 1/11 to be seen.

## 2024-01-11 ENCOUNTER — APPOINTMENT (OUTPATIENT)
Dept: PRIMARY CARE | Facility: HOSPITAL | Age: 54
End: 2024-01-11
Payer: COMMERCIAL

## 2024-01-24 ENCOUNTER — OFFICE VISIT (OUTPATIENT)
Dept: PRIMARY CARE | Facility: HOSPITAL | Age: 54
End: 2024-01-24
Payer: MEDICARE

## 2024-01-24 VITALS
HEART RATE: 94 BPM | DIASTOLIC BLOOD PRESSURE: 76 MMHG | BODY MASS INDEX: 34.04 KG/M2 | HEIGHT: 62 IN | SYSTOLIC BLOOD PRESSURE: 119 MMHG | OXYGEN SATURATION: 99 % | WEIGHT: 185 LBS

## 2024-01-24 DIAGNOSIS — M19.90 ARTHRITIS: ICD-10-CM

## 2024-01-24 DIAGNOSIS — Z00.00 ROUTINE HEALTH MAINTENANCE: Primary | ICD-10-CM

## 2024-01-24 DIAGNOSIS — D64.9 ANEMIA, UNSPECIFIED TYPE: ICD-10-CM

## 2024-01-24 LAB
ALBUMIN SERPL BCP-MCNC: 3.6 G/DL (ref 3.4–5)
ALP SERPL-CCNC: 94 U/L (ref 33–110)
ALT SERPL W P-5'-P-CCNC: 11 U/L (ref 7–45)
ANION GAP SERPL CALC-SCNC: 15 MMOL/L (ref 10–20)
AST SERPL W P-5'-P-CCNC: 31 U/L (ref 9–39)
BASOPHILS # BLD AUTO: 0.04 X10*3/UL (ref 0–0.1)
BASOPHILS NFR BLD AUTO: 0.8 %
BILIRUB SERPL-MCNC: 0.5 MG/DL (ref 0–1.2)
BUN SERPL-MCNC: 16 MG/DL (ref 6–23)
CALCIUM SERPL-MCNC: 9.2 MG/DL (ref 8.6–10.6)
CCP IGG SERPL-ACNC: <1 U/ML
CHLORIDE SERPL-SCNC: 98 MMOL/L (ref 98–107)
CHOLEST SERPL-MCNC: 165 MG/DL (ref 0–199)
CHOLESTEROL/HDL RATIO: 3.4
CO2 SERPL-SCNC: 35 MMOL/L (ref 21–32)
CREAT SERPL-MCNC: 0.91 MG/DL (ref 0.5–1.05)
EGFRCR SERPLBLD CKD-EPI 2021: 76 ML/MIN/1.73M*2
EOSINOPHIL # BLD AUTO: 0.19 X10*3/UL (ref 0–0.7)
EOSINOPHIL NFR BLD AUTO: 3.6 %
ERYTHROCYTE [DISTWIDTH] IN BLOOD BY AUTOMATED COUNT: 14 % (ref 11.5–14.5)
EST. AVERAGE GLUCOSE BLD GHB EST-MCNC: 114 MG/DL
GLUCOSE SERPL-MCNC: 80 MG/DL (ref 74–99)
HBA1C MFR BLD: 5.6 %
HCT VFR BLD AUTO: 35.2 % (ref 36–46)
HDLC SERPL-MCNC: 48 MG/DL
HGB BLD-MCNC: 11.3 G/DL (ref 12–16)
IMM GRANULOCYTES # BLD AUTO: 0.01 X10*3/UL (ref 0–0.7)
IMM GRANULOCYTES NFR BLD AUTO: 0.2 % (ref 0–0.9)
LDLC SERPL CALC-MCNC: 105 MG/DL
LYMPHOCYTES # BLD AUTO: 1.1 X10*3/UL (ref 1.2–4.8)
LYMPHOCYTES NFR BLD AUTO: 21.1 %
MCH RBC QN AUTO: 32.9 PG (ref 26–34)
MCHC RBC AUTO-ENTMCNC: 32.1 G/DL (ref 32–36)
MCV RBC AUTO: 103 FL (ref 80–100)
MONOCYTES # BLD AUTO: 0.53 X10*3/UL (ref 0.1–1)
MONOCYTES NFR BLD AUTO: 10.2 %
NEUTROPHILS # BLD AUTO: 3.34 X10*3/UL (ref 1.2–7.7)
NEUTROPHILS NFR BLD AUTO: 64.1 %
NON HDL CHOLESTEROL: 117 MG/DL (ref 0–149)
NRBC BLD-RTO: 0 /100 WBCS (ref 0–0)
PLATELET # BLD AUTO: 245 X10*3/UL (ref 150–450)
POTASSIUM SERPL-SCNC: 3.9 MMOL/L (ref 3.5–5.3)
PROT SERPL-MCNC: 8 G/DL (ref 6.4–8.2)
RBC # BLD AUTO: 3.43 X10*6/UL (ref 4–5.2)
RHEUMATOID FACT SER NEPH-ACNC: <10 IU/ML (ref 0–15)
SODIUM SERPL-SCNC: 144 MMOL/L (ref 136–145)
TRIGL SERPL-MCNC: 62 MG/DL (ref 0–149)
TSH SERPL-ACNC: 1.44 MIU/L (ref 0.44–3.98)
VLDL: 12 MG/DL (ref 0–40)
WBC # BLD AUTO: 5.2 X10*3/UL (ref 4.4–11.3)

## 2024-01-24 PROCEDURE — 85025 COMPLETE CBC W/AUTO DIFF WBC: CPT | Performed by: INTERNAL MEDICINE

## 2024-01-24 PROCEDURE — 80053 COMPREHEN METABOLIC PANEL: CPT | Performed by: INTERNAL MEDICINE

## 2024-01-24 PROCEDURE — 1036F TOBACCO NON-USER: CPT

## 2024-01-24 PROCEDURE — 80061 LIPID PANEL: CPT | Performed by: INTERNAL MEDICINE

## 2024-01-24 PROCEDURE — 86038 ANTINUCLEAR ANTIBODIES: CPT | Performed by: INTERNAL MEDICINE

## 2024-01-24 PROCEDURE — 84443 ASSAY THYROID STIM HORMONE: CPT | Performed by: INTERNAL MEDICINE

## 2024-01-24 PROCEDURE — 3074F SYST BP LT 130 MM HG: CPT

## 2024-01-24 PROCEDURE — 99214 OFFICE O/P EST MOD 30 MIN: CPT | Mod: GC,27

## 2024-01-24 PROCEDURE — 99214 OFFICE O/P EST MOD 30 MIN: CPT

## 2024-01-24 PROCEDURE — 3078F DIAST BP <80 MM HG: CPT

## 2024-01-24 PROCEDURE — 86200 CCP ANTIBODY: CPT | Performed by: INTERNAL MEDICINE

## 2024-01-24 PROCEDURE — 83036 HEMOGLOBIN GLYCOSYLATED A1C: CPT | Performed by: INTERNAL MEDICINE

## 2024-01-24 PROCEDURE — 86431 RHEUMATOID FACTOR QUANT: CPT | Performed by: INTERNAL MEDICINE

## 2024-01-24 PROCEDURE — 3008F BODY MASS INDEX DOCD: CPT

## 2024-01-24 PROCEDURE — 36415 COLL VENOUS BLD VENIPUNCTURE: CPT

## 2024-01-24 RX ORDER — PREDNISONE 10 MG/1
10 TABLET ORAL DAILY
Status: SHIPPED | OUTPATIENT
Start: 2024-01-24 | End: 2024-02-07

## 2024-01-24 RX ORDER — NAPROXEN 500 MG/1
500 TABLET ORAL
Qty: 14 TABLET | Refills: 0 | Status: SHIPPED | OUTPATIENT
Start: 2024-01-24 | End: 2024-01-24 | Stop reason: SINTOL

## 2024-01-24 ASSESSMENT — ENCOUNTER SYMPTOMS
COUGH: 1
DIARRHEA: 0
PALPITATIONS: 0
SHORTNESS OF BREATH: 1
DIZZINESS: 0
ARTHRALGIAS: 1
CHILLS: 0
BLOOD IN STOOL: 0
LOSS OF SENSATION IN FEET: 0
JOINT SWELLING: 1
VOMITING: 0
HEADACHES: 0
OCCASIONAL FEELINGS OF UNSTEADINESS: 1
NAUSEA: 0
CONSTIPATION: 0
FATIGUE: 0
DEPRESSION: 1
FEVER: 0
LIGHT-HEADEDNESS: 0

## 2024-01-24 ASSESSMENT — PATIENT HEALTH QUESTIONNAIRE - PHQ9
SUM OF ALL RESPONSES TO PHQ9 QUESTIONS 1 & 2: 0
1. LITTLE INTEREST OR PLEASURE IN DOING THINGS: NOT AT ALL
2. FEELING DOWN, DEPRESSED OR HOPELESS: NOT AT ALL

## 2024-01-24 ASSESSMENT — PAIN SCALES - GENERAL: PAINLEVEL: 6

## 2024-01-24 NOTE — PATIENT INSTRUCTIONS
Ms. Treviño,     It was our pleasure taking care of you in the [facility] today! Here are the things we discussed:     We think that you have something called rheumatoid arthritis, which is likely what is causing the pain and stiffness in your joints. For this, we would like to get some lab work and an Xray of your hands. We also sent a referral for you to see a rheumatologist, a doctor that specializes in this.   2.   We got some basic blood work to check your thyroid, cholesterol, electrolytes, blood counts. You are all up to date on your cancer screenings.    Please reach out to the clinic with any questions or concerns at [phone]. We will see you back in clinic in 1 month.     Nadia Olsen MD

## 2024-01-24 NOTE — PROGRESS NOTES
Chief complaint: Hand swelling/pain    HPI:  Rosita Treviño is a 53 y.o. female PMH of HFpEF, seizure disorder (on Keppra), chronic respiratory failure 2/2 asthma and COPD (baseline use 3L O2 PRN on BIPAP+ NC at night), Hodgkin's lymphoma in remission, L BRCA s/p mastectomy in remission, hx of partial hysterectomy, ENRIQUE (on nightly Bipap), and HTN. Presenting with a chief complaint of hand swelling and pain.     Patient reports that she went on 11/15 for similar complaint. Patient received a 10 day course of colchicine with improvement. Since then, the patient primarily complains of pain and stiffness. She states she does not have full use of fingers with occasional numbness. The top two joints are primarily affected. Morning stiffness lasts longer than 30 minutes.     Medications:    Current Outpatient Medications:     albuterol (Ventolin HFA) 90 mcg/actuation inhaler, Inhale 2 puffs every 4 hours if needed for wheezing or shortness of breath., Disp: 18 g, Rfl: 3    albuterol 0.63 mg/3 mL nebulizer solution, Take 3 mL (0.63 mg) by nebulization every 4 hours if needed., Disp: , Rfl:     budesonide-formoteroL (Symbicort) 160-4.5 mcg/actuation inhaler, Inhale 2 puffs 2 times a day., Disp: 1 each, Rfl: 11    cyanocobalamin (Vitamin B-12) 1,000 mcg tablet, Take 1 tablet (1,000 mcg) by mouth once daily., Disp: , Rfl:     dapagliflozin propanediol (Farxiga) 10 mg, TAKE 1 TABLET BY MOUTH DAILY, Disp: 5 tablet, Rfl: 0    ipratropium-albuteroL (Duo-Neb) 0.5-2.5 mg/3 mL nebulizer solution, USE 1 VIAL IN NEBULIZER EVERY 8 HOURS AS DIRECTED, Disp: 45 mL, Rfl: 0    levETIRAcetam (Keppra) 500 mg tablet, Take 1 tablet (500 mg) by mouth 2 times a day., Disp: 180 tablet, Rfl: 3    metOLazone (Zaroxolyn) 5 mg tablet, Take 1 tablet (5 mg) by mouth 1 (one) time per week in the early morning.., Disp: , Rfl:     metoprolol succinate XL (Toprol-XL) 100 mg 24 hr tablet, Take 1 tablet (100 mg) by mouth once daily., Disp: 90 tablet, Rfl: 3     oxygen (O2) therapy, 3L BiPAP BLEED NIGHTLY, Disp: , Rfl:     potassium chloride CR 20 mEq ER tablet, Take 1 tablet (20 mEq) by mouth 2 times a day., Disp: , Rfl:     torsemide (Demadex) 20 mg tablet, Take 2 tablets (40 mg) by mouth 2 times a day., Disp: 120 tablet, Rfl: 11    Allergies:  Allergies   Allergen Reactions    House Dust Cough    Iodinated Contrast Media Itching    Irbesartan Unknown    Salicylates Unknown    Aspirin Rash    Gadolinium-Containing Contrast Media Rash    Penicillins Rash and Hives       Review of systems:  Review of Systems   Constitutional:  Negative for chills, fatigue and fever.   Respiratory:  Positive for cough and shortness of breath.    Cardiovascular:  Positive for leg swelling. Negative for chest pain and palpitations.   Gastrointestinal:  Negative for blood in stool, constipation, diarrhea, nausea and vomiting.   Musculoskeletal:  Positive for arthralgias and joint swelling.   Neurological:  Negative for dizziness, light-headedness and headaches.         Vitals:  Vitals:    01/24/24 1505   BP: 119/76   Pulse: 94   SpO2: 99%       Physical exam:  Constitutional: Well-developed female in no acute distress.  HEENT: Normocephalic, atraumatic. PERRL. EOMI. No cervical lymphadenopathy.  Respiratory: CTA bilaterally. No wheezes, rales, or rhonchi. Normal respiratory effort.  Cardiovascular: RRR. No murmurs, gallops, or rubs. No JVD. Radial pulses 2+.  Abdominal: Soft, nondistended, nontender to palpation. Bowel sounds present. No hepatosplenomegaly or masses. No CVA tenderness.  Neuro: CN II-XII intact. UE and LE strength 5/5 bilaterally and sensation intact. Normal FTN testing.  MSK: No LE edema bilaterally.  Skin: Warm, dry. No rashes or wounds.  Psych: Appropriate mood and affect.    Labs:  No results found for this or any previous visit (from the past 24 hour(s)).    Imaging:  No results found.    Assessment and plan:  Rosita Treviño is a 53 y.o. female w/ PMH of HFpEF, seizure  "disorder (on Keppra), chronic respiratory failure 2/2 asthma and COPD (baseline use 3L O2 PRN on BIPAP+ NC at night), Hodgkin's lymphoma in remission, L BRCA s/p mastectomy in remission, hx of partial hysterectomy, ENRIQUE (on nightly Bipap), and HTN. Patient presenting with chief complaint of BL hand pain, concerning for rheumatoid arthritis.     #Concern for rheumatoid arthritis  - Pt reports BL pollock pain, primarily DIP and PIP, > 30 mins of morning stiffness  - Reported allergy to NSAIDs (hives)   - Pending TREY, anti CPC, RF   - Prednisone 10 mg x 14 days   - BL Hand Xray ordered   - Referral to rheumatology placed      #HTN  ::Pt on thiazide (metolazone 5mg) once weekly, BP normal today and on last reading. Pt reports focusing on life style changes  ::ASCVD low 2.5%, no need for statin  -well controlled today   -last labs with very low potassium, the patient recently started PO potassium. We will order CMP, CBC for today     #HFpEF  ::Pt w/EF 55-60% (Sept 2022), on dual diuresis; daily torsemide 20 mg BID and weekly metolazone 5 mg, however with O2 requirement and LE edema. Could be in setting of steroid use during recent hospitalization.   -taking Eplerenone  -c/w Metoprolol succinate 100 mg   -the patient will continue follow up with her cardiologist     #COPD/Asthma/ chronic respiratory failure on 3 L oxygen  ::Pt using rescue albuterol 1-2 times a month. However on 3L O2 24/7 which is worse for her. (previously only PRN). â€“following with pulm for this; last appt 7/22 â€“ asked pt to follow up with sleep medicine, placed referral for pulm rehab, c/w nocturnal BIPAP; repeat PFTs (FEV-1 36% Aug 2022)     #Macrocytic anemia  ::MCV >100 since May 2022   ::Hg 11-12  -B12 levels Ok     #ENRIQUE  -Pt is compliant with BiPAP at home, reports good sleep quality     #Seizure Disorder  ::had one witnessed \"seizure\" 7/17 with admission to [facility] associated with respiratory arrest  ::started on Levetiracetam. Neurology follow up " is pending  - has not seizures since 2017     #HM  -Mammogram: Pt s/p b/l mastectomy. No Mammograms  -Colonoscopy: Nl C-scope in 2021. Next in 2031.   -PAP smear: Patient mentioned last PAP smear in Jan 2022 was normal   - Flu vaccine due today; denies   -Pneumococcal: Received 2x pneum vaccine (Dec 2013). Denies today  -Shingrix vaccine: denies   -DTAP: April 2016  -COVID: denies  - Will order HgA1c, Lipid panel, CBC, CMP, TSH today     Follow-up in 1 month.    Patient and plan discussed with attending physician, Dr. Rouse.    Nadia Olsen MD  PGY-1 Internal Medicine  [facility]

## 2024-01-26 LAB — ANA SER QL HEP2 SUBST: NEGATIVE

## 2024-01-26 NOTE — PROGRESS NOTES
"39 yo woman here for follow up.    Migraine headaches  Obesity with BMI 38  Popliteal tendonitis, traumatic from injury 2 years ago  Cigarette smoker 10 cigs /day  GERD on treatment    Ms. Joseph works at the [employer] and is on her feet all day for the full shift. By the end of the day, she reports significant knee swelling and pain. She is requesting a handicap placard for her car/parking.     She is also interested in changing her migraine medication from the tablet back to the spray. The tablet causes considerable nausea.     She is working on lifestyle changes to help with her GERD symptoms but continues to take a PPI regularly.    She is due for GYN exam and Pap smear.     Lives with two children 12 and 16 year olds.    On depoprovera for birthcontrol.    Exam:  /76 (BP Location: Left arm, BP Cuff Size: Adult)   Pulse 94   Ht 1.575 m (5' 2\")   Wt 83.9 kg (185 lb)   SpO2 99%   BMI 33.84 kg/m²   Lungs clear  CV RRR  Abd soft NT  Ext mild swelling left knee, stiff + crepitus    A/P  Recommend QUIT SMOKING. Trial of nicotine patches.  Recommend weight loss and good quality shoes on the job.  No handicap placard at this time but reconsideration in 3-4 months of still problematic.  Sumatriptan spray provided in lieu of tablets.  Labs today including lipids.  OB/GYN referral for Pap smear    RTC 1 year or sooner if needed.    I saw and evaluated the patient. I personally obtained the key and critical portions of the history and physical exam or was physically present for key and critical portions performed by the resident/fellow. I reviewed the resident/fellow's documentation and discussed the patient with the resident/fellow. I agree with the resident/fellow's medical decision making as documented in the note.    Agatha Rouse MD      "

## 2024-01-26 NOTE — PROGRESS NOTES
"Ms. Treviño is a 54 yo woman with HfpEF, seizure disorder, COPD (on 3 lpm oxygen), ENRIQUE on BiPAP, s/p hodgkins lymphoma, s/p left breast cancer, s/p partial hysterectomy.    She is here to have an evaluation of her bilateral hand pain, swelling and stiffness, worse in the morning. She gets some improvement from NSAID but is reluctant to keep taking them. She has trouble with both DIP and PIP joints.    She also reports occasional shortness of breath, worse with exertion but also happening at rest.    She denies use of drugs; is not sexuallya ctive    Lives with her son    We have reviewed her medications  HM up to date    Exam:  /76 (BP Location: Left arm, BP Cuff Size: Adult)   Pulse 94   Ht 1.575 m (5' 2\")   Wt 83.9 kg (185 lb)   SpO2 99%   BMI 33.84 kg/m²     Bilateral hands with minimal synovial thickening and no warmth, middle finger digit of left hand with large knuckle (grand son \"bit\" it). No redness, no bone deformity. ROM on wrists okay.    A/P Agree with Dr. Olsen to pursue workup of inflammatory arthropathy (SLE, RA, other) with labs and xray. Will refer to rheumatology and ask patient to follow up with us in one month. She is reluctant to use NSAID but agrees to low dose prednisone 10 mg daily x 2 weeks between now and when she can see rheumatology.     I saw and evaluated this patient. I personally obtained the key and critical portions of the history and physical exam or was physically present for key and critical portions performed by the resident. I agree with the resident's medical decision making as documented in the note.  " no

## 2024-01-27 DIAGNOSIS — I50.32 CHRONIC HEART FAILURE WITH PRESERVED EJECTION FRACTION (MULTI): Primary | ICD-10-CM

## 2024-01-29 RX ORDER — METOLAZONE 5 MG/1
TABLET ORAL
Qty: 12 TABLET | Refills: 1 | Status: SHIPPED | OUTPATIENT
Start: 2024-01-29

## 2024-01-29 NOTE — RESULT ENCOUNTER NOTE
Contacted patient about lab work. She reports she has not been able to fill prescription for prednisone or obtain hand XR yet due to difficulty with insurance, but reports that she will attempt to again this week. She reports her hand pain is about the same. Instructed to call back with any additional questions or concerns.     Nadia Olsen, PGY-1   Internal Medicine  [facility]

## 2024-02-07 ENCOUNTER — TELEPHONE (OUTPATIENT)
Dept: PRIMARY CARE | Facility: HOSPITAL | Age: 54
End: 2024-02-07
Payer: MEDICARE

## 2024-02-21 ENCOUNTER — APPOINTMENT (OUTPATIENT)
Dept: DERMATOLOGY | Facility: CLINIC | Age: 54
End: 2024-02-21
Payer: MEDICARE

## 2024-02-25 NOTE — PROGRESS NOTES
52 yo woman with HfpEF, seizure disorder, COPD (on 3 lpm oxygen), ENRIQUE on BiPAP, s/p hodgkins lymphoma, s/p left breast cancer, s/p partial hysterectomy referred by PCP for evaluation of hand pain.     Of note, she presented to the ED at [facility] for R hand pain in November 2023 and noted to have elevated CRP, elevated ESR, and Uric acid 9.7. Xrays demonstrated mild soft tissue swelling. Was treated with Colchicine for Gout.     Presented again to her PCP in January with complaints of bilateral hand pain. Noted morning stiffness lasting greater than 30 mins raising concern for inflammatory arthritis. Labs including TREY, RF, and anti-ccp were negative.

## 2024-02-26 ENCOUNTER — APPOINTMENT (OUTPATIENT)
Dept: RHEUMATOLOGY | Facility: CLINIC | Age: 54
End: 2024-02-26
Payer: MEDICARE

## 2024-03-28 DIAGNOSIS — Z00.00 ROUTINE HEALTH MAINTENANCE: Primary | ICD-10-CM

## 2024-03-28 RX ORDER — POTASSIUM CHLORIDE 20 MEQ/1
20 TABLET, EXTENDED RELEASE ORAL 2 TIMES DAILY
Qty: 60 TABLET | Refills: 0 | Status: SHIPPED | OUTPATIENT
Start: 2024-03-28 | End: 2024-04-27

## 2024-04-11 ENCOUNTER — APPOINTMENT (OUTPATIENT)
Dept: PRIMARY CARE | Facility: HOSPITAL | Age: 54
End: 2024-04-11
Payer: MEDICARE

## 2024-04-26 ENCOUNTER — OFFICE VISIT (OUTPATIENT)
Dept: PRIMARY CARE | Facility: HOSPITAL | Age: 54
End: 2024-04-26
Payer: MEDICARE

## 2024-04-26 VITALS
SYSTOLIC BLOOD PRESSURE: 132 MMHG | OXYGEN SATURATION: 98 % | WEIGHT: 193 LBS | TEMPERATURE: 97.5 F | BODY MASS INDEX: 35.51 KG/M2 | HEIGHT: 62 IN | HEART RATE: 94 BPM | DIASTOLIC BLOOD PRESSURE: 84 MMHG

## 2024-04-26 DIAGNOSIS — I50.32 CHRONIC HEART FAILURE WITH PRESERVED EJECTION FRACTION (MULTI): ICD-10-CM

## 2024-04-26 DIAGNOSIS — M54.2 NECK PAIN: Primary | ICD-10-CM

## 2024-04-26 PROCEDURE — 3079F DIAST BP 80-89 MM HG: CPT

## 2024-04-26 PROCEDURE — 99214 OFFICE O/P EST MOD 30 MIN: CPT | Mod: GC

## 2024-04-26 PROCEDURE — 3008F BODY MASS INDEX DOCD: CPT

## 2024-04-26 PROCEDURE — 3075F SYST BP GE 130 - 139MM HG: CPT

## 2024-04-26 PROCEDURE — 99214 OFFICE O/P EST MOD 30 MIN: CPT

## 2024-04-26 RX ORDER — ACETAMINOPHEN 500 MG
1000 TABLET ORAL EVERY 6 HOURS PRN
Qty: 30 TABLET | Refills: 0 | Status: SHIPPED | OUTPATIENT
Start: 2024-04-26 | End: 2024-05-06

## 2024-04-26 RX ORDER — DICLOFENAC SODIUM 10 MG/G
4 GEL TOPICAL 4 TIMES DAILY
Qty: 4 G | Refills: 0 | Status: SHIPPED | OUTPATIENT
Start: 2024-04-26 | End: 2024-05-26

## 2024-04-26 RX ORDER — PREDNISONE 20 MG/1
40 TABLET ORAL DAILY
Qty: 10 TABLET | Refills: 0 | Status: SHIPPED | OUTPATIENT
Start: 2024-04-26 | End: 2024-05-01

## 2024-04-26 RX ORDER — LIDOCAINE 50 MG/G
1 PATCH TOPICAL DAILY
Qty: 1 PATCH | Refills: 0 | Status: SHIPPED | OUTPATIENT
Start: 2024-04-26 | End: 2024-04-30 | Stop reason: WASHOUT

## 2024-04-26 ASSESSMENT — PATIENT HEALTH QUESTIONNAIRE - PHQ9
1. LITTLE INTEREST OR PLEASURE IN DOING THINGS: NOT AT ALL
SUM OF ALL RESPONSES TO PHQ9 QUESTIONS 1 AND 2: 0
2. FEELING DOWN, DEPRESSED OR HOPELESS: NOT AT ALL

## 2024-04-26 ASSESSMENT — ENCOUNTER SYMPTOMS
OCCASIONAL FEELINGS OF UNSTEADINESS: 0
LOSS OF SENSATION IN FEET: 0
DEPRESSION: 0

## 2024-04-26 ASSESSMENT — PAIN SCALES - GENERAL: PAINLEVEL: 8

## 2024-04-26 NOTE — PATIENT INSTRUCTIONS
Mrs. Treviño,   We talked today about the neck and back pain you have been having. You are also experiencing hand pain, swelling and stiffness.   We recommend an x ray of your spine, and ordered pain medication to help with the pain. We encourage to see rheumatology clinic as soon as you can. And we put in a referral for physical therapy and occupational therapy.   We do recommend a short course of prednisone to help with the inflammation you are having in your hands.     We would like to see you in 3 months to follow up on your symptoms.     It was a pleasure talking care of you  [facility]   12/28/18  Screened by: Petty Navarro    Referring Provider MADDI BROWN    Pre- Screening: There is no height or weight on file to calculate BMI  Has patient been referred for a routine screening Colonoscopy? yes  Is the patient between 39-70 years old? yes    SCHEDULING STAFF   If the patient is between 45yrs-49yrs, please advise patient to confirm benefits/coverage with their insurance company for a routine screening colonoscopy, some insurance carriers will only cover at [id number] or older   If the patient is over 66years old, please schedule an office visit  Do you have any of the following symptoms? NO    Have you had a coronary or vascular stent within the last year? no    Have you had a heart attack or stroke in the last 6 months? no    Have you had intestinal surgery in the last 3 months? no    Do you have problems with: NO    Do you use: NO    Have you been hospitalized in the last Month? no    Have you been diagnosed with a bleeding disorder or anemia? no    Have you had chest pain (angina) or breathing problems  (COPD) in the last 3 months? no    Do you have any difficulty walking up a flight of stairs? no    Have you had Kidney failure or insufficiency? no    Have you had heart valve surgery? no    Are you confined to a wheelchair? no    Do you take NO    Have you been diagnosed with Diabetes or are you taking any   Diabetic medications? no    : If patient answers NO to medical questions, then schedule procedure  If patient answers YES to medical questions, then schedule office appointment  Previous Colonoscopy no  Date and Facility of last colonoscopy?      Comments:

## 2024-04-26 NOTE — PROGRESS NOTES
Chief complaint: Back and neck pain + stiffness.     HPI:  Rosita Treviño is a 53 y.o. female  w/ PMH of HFpEF, seizure disorder (on Keppra), chronic respiratory failure 2/2 asthma and restrictive pattern on PFT (baseline use 2-3L O2 BIPAP+ NC at night), Hodgkin's lymphoma in remission, L BRCA s/p mastectomy in remission, hx of partial hysterectomy, ENRIQUE (on nightly Bipap), and HTN. Patient presenting with chief complaint of chronic progressive lower back and cervical neck pain associated with stiffness. The patient report s the pain has been present for years, with gradual progression in severity.  the pain and stiffness are not associated with a certain time of day, not relieved by activity or rest. Messaging the neck and back helps partially relieve the pain, tylenol partially relives it as well. The patients pain was doing better when she had OT and PT sessions about 2 years ago when she was in [place]. Carrying the oxygen tank all the time is aggravating her pain. No UL radiation of pain, no weakness or numbness. No trauma.     The patient was last seen 1/2024 for small hand joint swelling tenderness and stiffness bilaterally but more on the right side, she was prescribed a course of steroid for possible Rheumatoid arthritis and referred to rheumatology at that time. The patient was not able to make it to the rheumatology appt, and she was reluctant to start steroid course because she is concerned about the Side effects. Today she still has stiffness on her bilateral hands mostly at the right. And tenderness of the small PIP, MCP hand joints but reports it is significantly better than on 1/2024.   Workup for RA at that time was negative for TREY, RF, Anti CCP, TSH norm. XR was not done.     Denies fever, weight loss, Shortness of breath, change in appetite ( usually eats 1-2 meals a day, restricts her fluid, and tries to be mindful of eating low salt diet), no rash, no urinary of GI concerns.        Medications:    Current Outpatient Medications:     albuterol (Ventolin HFA) 90 mcg/actuation inhaler, Inhale 2 puffs every 4 hours if needed for wheezing or shortness of breath., Disp: 18 g, Rfl: 3    albuterol 0.63 mg/3 mL nebulizer solution, Take 3 mL (0.63 mg) by nebulization every 4 hours if needed., Disp: , Rfl:     budesonide-formoteroL (Symbicort) 160-4.5 mcg/actuation inhaler, Inhale 2 puffs 2 times a day., Disp: 1 each, Rfl: 11    cyanocobalamin (Vitamin B-12) 1,000 mcg tablet, Take 1 tablet (1,000 mcg) by mouth once daily., Disp: , Rfl:     dapagliflozin propanediol (Farxiga) 10 mg, TAKE 1 TABLET BY MOUTH DAILY, Disp: 5 tablet, Rfl: 0    ipratropium-albuteroL (Duo-Neb) 0.5-2.5 mg/3 mL nebulizer solution, USE 1 VIAL IN NEBULIZER EVERY 8 HOURS AS DIRECTED, Disp: 45 mL, Rfl: 0    levETIRAcetam (Keppra) 500 mg tablet, Take 1 tablet (500 mg) by mouth 2 times a day., Disp: 180 tablet, Rfl: 3    metOLazone (Zaroxolyn) 5 mg tablet, TAKE 1 TABLET BY MOUTH 1 TIME A WEEK ON SUNDAYS, Disp: 12 tablet, Rfl: 1    metoprolol succinate XL (Toprol-XL) 100 mg 24 hr tablet, Take 1 tablet (100 mg) by mouth once daily., Disp: 90 tablet, Rfl: 3    oxygen (O2) therapy, 3L BiPAP BLEED NIGHTLY, Disp: , Rfl:     potassium chloride CR 20 mEq ER tablet, Take 1 tablet (20 mEq) by mouth 2 times a day., Disp: 60 tablet, Rfl: 0    torsemide (Demadex) 20 mg tablet, Take 2 tablets (40 mg) by mouth 2 times a day., Disp: 120 tablet, Rfl: 11    Allergies:  Allergies   Allergen Reactions    House Dust Cough    Iodinated Contrast Media Itching    Irbesartan Unknown    Salicylates Unknown    Aspirin Rash    Gadolinium-Containing Contrast Media Rash    Penicillins Rash and Hives       Past medical history:  Past Medical History:   Diagnosis Date    Acute atopic conjunctivitis, bilateral 08/28/2017    Allergic conjunctivitis of both eyes    Acute upper respiratory infection, unspecified 01/04/2017    URTI (acute upper respiratory infection)     Anesthesia of skin 06/19/2015    Numbness and tingling    Candidiasis of skin and nail 11/21/2013    Cutaneous candidiasis    Dietary folate deficiency anemia 06/22/2016    Anemia, macrocytic, nutritional    Dizziness and giddiness 12/20/2016    Lightheadedness    Encounter for screening for human immunodeficiency virus (HIV) 01/06/2016    Screening for HIV (human immunodeficiency virus)    Epilepsy, unspecified, not intractable, without status epilepticus (Multi) 08/28/2017    Epilepsy    Hodgkin lymphoma, unspecified, unspecified site (Multi) 07/27/2013    Hodgkin's disease    Localized edema 06/24/2016    Leg edema    Obstructive sleep apnea (adult) (pediatric) 09/12/2014    ENRIQUE on CPAP    Other polyuria 07/06/2016    Diuresis    Other specified cough 05/01/2018    Cough with expectoration    Other specified disorders of the skin and subcutaneous tissue 09/12/2014    Skin plaque    Pain in left shoulder 05/21/2015    Left shoulder pain    Pain in unspecified ankle and joints of unspecified foot 11/18/2015    Ankle pain    Pain in unspecified foot 11/19/2015    Foot pain    Personal history of diseases of the blood and blood-forming organs and certain disorders involving the immune mechanism 01/12/2016    History of macrocytic anemia    Personal history of Hodgkin lymphoma 06/08/2022    History of Hodgkin's lymphoma    Personal history of non-Hodgkin lymphomas 03/09/2017    History of lymphoma    Personal history of other diseases of the circulatory system 10/25/2013    History of hypertension    Personal history of other diseases of the respiratory system 01/15/2018    History of acute bronchitis    Personal history of other diseases of the respiratory system 09/12/2014    Personal history of asthma    Personal history of other diseases of the respiratory system 08/24/2016    History of allergic rhinitis    Personal history of other endocrine, nutritional and metabolic disease 11/28/2017    History of obesity     Personal history of other endocrine, nutritional and metabolic disease 12/20/2016    History of fluid overload    Personal history of other specified conditions 08/24/2016    History of shortness of breath    Personal history of other specified conditions 11/30/2015    History of wheezing    Personal history of other specified conditions 08/28/2017    History of headache    Personal history of other specified conditions 07/11/2017    History of chest pain    Personal history of other specified conditions 06/22/2016    History of fatigue    Personal history of pneumonia (recurrent) 01/12/2016    History of pneumonia    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    Lymphedema syndrome, postmastectomy    Right upper quadrant abdominal tenderness 02/23/2016    Right upper quadrant abdominal tenderness    Unspecified amblyopia, left eye     Amblyopia, left eye    Unspecified disorder of binocular vision 08/28/2017    Binocular visual disturbance       Surgical history:  Past Surgical History:   Procedure Laterality Date    HYSTERECTOMY  11/28/2017    Hysterectomy    MASTECTOMY  07/16/2014    Breast Surgery Mastectomy       Family history:  Family History   Problem Relation Name Age of Onset    Other (age macular degneration) Mother      Breast cancer Mother      Coronary artery disease Mother      Glaucoma Mother      Hypertension Mother      Heart attack Father          acute    Colon cancer Father      Coronary artery disease Father      Diabetes Father      Glaucoma Father      Heart attack Sister          acute    Hypertension Sister      Multiple sclerosis Sister      Hypertension Brother      Amblyopia Son         Social history:   reports that she has never smoked. She has never used smokeless tobacco. She reports that she does not drink alcohol and does not use drugs.    Health maintenance:  Health Maintenance   Topic Date Due    Medicare Annual Wellness Visit (AWV)  Never done    HIV Screening  Never done     MMR Vaccines (1 of 1 - Standard series) Never done    COVID-19 Vaccine (1) Never done    Diabetes: Foot Exam  Never done    Hepatitis C Screening  Never done    Hepatitis B Vaccines (1 of 3 - 19+ 3-dose series) Never done    Diabetes: Urine Protein Screening  Never done    Hepatitis A Vaccines (1 of 2 - Risk 2-dose series) Never done    Zoster Vaccines (1 of 2) Never done    Cervical Cancer Screening  Never done    Pneumococcal Vaccine: Pediatrics (0 to 5 Years) and At-Risk Patients (6 to 64 Years) (2 of 2 - PCV) 12/13/2014    Diabetes: Retinopathy Screening  04/20/2019    Colorectal Cancer Screening  09/17/2022    Diabetes: Hemoglobin A1C  04/24/2024    Influenza Vaccine (Season Ended) 09/01/2024    Echocardiogram  09/29/2024    Creatinine Level  01/24/2025    Potassium Level  01/24/2025    Lipid Panel  01/24/2025    DTaP/Tdap/Td Vaccines (3 - Td or Tdap) 04/19/2026    HIB Vaccines  Aged Out    IPV Vaccines  Aged Out    Meningococcal Vaccine  Aged Out    Rotavirus Vaccines  Aged Out    HPV Vaccines  Aged Out    Irritable Bowel Syndrome  Discontinued     Review of systems:  Review of Systems   Constitutional:  Negative for appetite change, chills and fever.   HENT:  Negative for sore throat and trouble swallowing.    Respiratory:  Negative for cough, chest tightness and shortness of breath.    Cardiovascular:  Negative for chest pain, palpitations and leg swelling.   Gastrointestinal:  Negative for abdominal distention, abdominal pain, constipation and diarrhea.   Musculoskeletal:  Positive for arthralgias, back pain, joint swelling, neck pain and neck stiffness.   Neurological:  Negative for tremors, syncope, facial asymmetry, light-headedness and headaches.   Hematological:  Negative for adenopathy.        Vitals:  There were no vitals filed for this visit.    Physical exam:  Physical Exam  Constitutional:       General: She is not in acute distress.     Appearance: Normal appearance.      Comments: Looks  euvolemic   HENT:      Head: Normocephalic and atraumatic.   Eyes:      General: No scleral icterus.     Extraocular Movements: Extraocular movements intact.      Conjunctiva/sclera: Conjunctivae normal.   Neck:      Comments: Positive for cervical tenderness, tense neck muscles, with intact active cervical neck ROM but the patient needs to move it slowly due to pain.   Cardiovascular:      Rate and Rhythm: Normal rate and regular rhythm.   Pulmonary:      Effort: Pulmonary effort is normal. No respiratory distress.      Breath sounds: Normal breath sounds.      Comments: No crackles  Abdominal:      General: Abdomen is flat. There is no distension.      Palpations: Abdomen is soft.      Tenderness: There is no abdominal tenderness.      Hernia: A hernia is present.   Musculoskeletal:         General: Normal range of motion.      Cervical back: Tenderness present. No rigidity.      Right lower leg: No edema.      Left lower leg: No edema.      Comments: Hand: bilateral small joint PIP, MCP joints bogginess, and tenderness, no hotness, or erythema. No deformity, normal  and power on the Lt hand .  Restricted hand  and decrease power on the right hand due to stiffness and pain.     Back: cervical spine deformity, tenderness all over the spine and paraspine mostly at the lumber and cervical areas   Lymphadenopathy:      Cervical: No cervical adenopathy.   Neurological:      Mental Status: She is alert and oriented to person, place, and time.           Constitutional: Well-developed female in no acute distress.  HEENT: Normocephalic, atraumatic. PERRL. EOMI. No cervical lymphadenopathy.  Respiratory: CTA bilaterally. No wheezes, rales, or rhonchi. Normal respiratory effort.  Cardiovascular: RRR. No murmurs, gallops, or rubs. No JVD. Radial pulses 2+.  Abdominal: Soft, nondistended, nontender to palpation. Bowel sounds present. No hepatosplenomegaly or masses. No CVA tenderness.  Neuro: CN II-XII intact. UE and  "LE strength 5/5 bilaterally and sensation intact. Normal FTN testing.  MSK: No LE edema bilaterally.  Skin: Warm, dry. No rashes or wounds.  Psych: Appropriate mood and affect.     Labs:  Lab Results   Component Value Date    WBC 5.2 01/24/2024    HGB 11.3 (L) 01/24/2024    HCT 35.2 (L) 01/24/2024     (H) 01/24/2024     01/24/2024       Lab Results   Component Value Date    GLUCOSE 80 01/24/2024    CALCIUM 9.2 01/24/2024     01/24/2024    K 3.9 01/24/2024    CO2 35 (H) 01/24/2024    CL 98 01/24/2024    BUN 16 01/24/2024    CREATININE 0.91 01/24/2024       Lab Results   Component Value Date    HGBA1C 5.6 01/24/2024        Lab Results   Component Value Date    CHOL 165 01/24/2024    CHOL 122 10/21/2022    CHOL 124 05/12/2022     Lab Results   Component Value Date    HDL 48.0 01/24/2024    HDL 35.8 (A) 10/21/2022    HDL 28.3 (A) 05/12/2022     Lab Results   Component Value Date    LDLCALC 105 (H) 01/24/2024     Lab Results   Component Value Date    TRIG 62 01/24/2024    TRIG 115 10/21/2022    TRIG 64 11/28/2017     No components found for: \"CHOLHDL\"    Imaging:  No results found.    Assessment and plan:    Rosita Treviño is a 53 y.o. female  w/ PMH of HFpEF, seizure disorder (on Keppra), chronic respiratory failure 2/2 asthma and restrictive pattern on PFT (baseline use 2-3L O2 BIPAP+ NC at night), Hodgkin's lymphoma in remission, L BRCA s/p mastectomy in remission, hx of partial hysterectomy, ENRIQUE (on nightly Bipap), and HTN. Patient presenting with chief complaint of back and neck pain that are chronic but progressively worsening.      # Chronic progressive back/Neck pain + stiffness  Likely multifactorial: muscle weakness, carrying oxygen tanks all the time, possible underlying spine bone deformity, previous old history of trauma.     - XR cervical spine ordered  - Tylenol, diclofenac gel, Lidocaine patches prescribed.   - OT/PT referral   -  rheumatology referral     #Concern for rheumatoid " "arthritis  Today she still has stiffness on her bilateral hands mostly at the right. And tenderness of the small PIP, MCP hand joints but reports it is significantly better than on 1/2024.   Workup for RA at that time was negative for TREY, RF, Anti CCP, TSH norm. XR was not done.  Differential is broad, clinical picture is suggestive of inflammatory arthritis.   - Reported allergy to NSAIDs (hives)    Plan:  - The patient is agreeable to taking Prednisone 10 mg x 14 days   - Referral to rheumatology placed      #HTN  ::Pt on thiazide (metolazone 5mg) once weekly, BP normal today and on last reading. Pt reports focusing on life style changes  ::ASCVD low 2.5%, no need for statin  -well controlled today      #HFpEF  ::Pt w/EF 55-60% (Sept 2022), on dual diuresis; daily torsemide 40 mg BID and weekly metolazone 5 mg. Is euvolemic this visit.   - increasing wt 193 ibs < 176 on 11/2023, No sx or symptoms of fluid overload.  -taking Eplerenone  -c/w Metoprolol succinate 100 mg   -the patient will continue follow up with her cardiologist     #Restrictive lung disease/Asthma/ chronic respiratory failure on 2-3 L oxygen  ::following with pulmonology   Referred pulm rehab,   C/w nocturnal BIPAP     #Macrocytic anemia  ::MCV >100 since May 2022   ::Hg 11-12  -B12 levels Ok     #ENRIQUE  -Pt is compliant with BiPAP at home, reports good sleep quality     #Seizure Disorder  ::had one witnessed \"seizure\" 7/17 with admission to [facility] associated with respiratory arrest  ::started on Levetiracetam. Neurology follow up is pending  - has not seizures since 2017     #HM  -Mammogram: Pt s/p b/l mastectomy. No Mammograms  -Colonoscopy: Nl C-scope in 2021. Next in 2031.   -PAP smear: Patient mentioned last PAP smear in Jan 2022 was normal   - Flu vaccine due today; denies   -Pneumococcal: Received 2x pneum vaccine (Dec 2013). Denies today  -Shingrix vaccine: denies   -DTAP: April 2016  -COVID: denies    Follow-up in 3 months.    Patient and " plan discussed with attending physician Dr. Rees.    MD [facility]

## 2024-04-27 ASSESSMENT — ENCOUNTER SYMPTOMS
FACIAL ASYMMETRY: 0
PALPITATIONS: 0
NECK STIFFNESS: 1
ADENOPATHY: 0
DIARRHEA: 0
ABDOMINAL PAIN: 0
NECK PAIN: 1
HEADACHES: 0
LIGHT-HEADEDNESS: 0
JOINT SWELLING: 1
BACK PAIN: 1
COUGH: 0
CONSTIPATION: 0
ABDOMINAL DISTENTION: 0
SHORTNESS OF BREATH: 0
TROUBLE SWALLOWING: 0
CHEST TIGHTNESS: 0
FEVER: 0
APPETITE CHANGE: 0
TREMORS: 0
ARTHRALGIAS: 1
CHILLS: 0
SORE THROAT: 0

## 2024-04-30 NOTE — PROGRESS NOTES
Personally discussed importance of trial of prednisone.  The patient reports a history of being on steroids long-term this started with short trial.  Discussed that this is true for respiratory symptoms, but we rarely do this for inflammation of the joints.  Also given the degree of her symptoms, it is worth a trial.  We previously ordered rheumatologic workup that was negative and she did not obtain the imaging that was ordered at the previous visit.  Advised her to go and obtain that today.    I saw and evaluated the patient. I personally obtained the key and critical portions of the history and physical exam or was physically present for key and critical portions performed by the resident/fellow. I reviewed the resident/fellow's documentation and discussed the patient with the resident/fellow. I agree with the resident/fellow's medical decision making as documented in the note.    Reza Rees MD MPH

## 2024-05-03 ENCOUNTER — TELEPHONE (OUTPATIENT)
Dept: PRIMARY CARE | Facility: HOSPITAL | Age: 54
End: 2024-05-03
Payer: MEDICARE

## 2024-05-06 ENCOUNTER — APPOINTMENT (OUTPATIENT)
Dept: SLEEP MEDICINE | Facility: CLINIC | Age: 54
End: 2024-05-06
Payer: MEDICARE

## 2024-05-08 ENCOUNTER — EVALUATION (OUTPATIENT)
Dept: PHYSICAL THERAPY | Facility: CLINIC | Age: 54
End: 2024-05-08
Payer: MEDICARE

## 2024-05-08 DIAGNOSIS — M54.2 NECK PAIN: Primary | ICD-10-CM

## 2024-05-08 DIAGNOSIS — R29.3 POOR POSTURE: ICD-10-CM

## 2024-05-08 DIAGNOSIS — R29.898 DECREASED ROM OF NECK: ICD-10-CM

## 2024-05-08 PROCEDURE — 97162 PT EVAL MOD COMPLEX 30 MIN: CPT | Mod: GP

## 2024-05-08 ASSESSMENT — PAIN - FUNCTIONAL ASSESSMENT: PAIN_FUNCTIONAL_ASSESSMENT: 0-10

## 2024-05-08 ASSESSMENT — ENCOUNTER SYMPTOMS
LOSS OF SENSATION IN FEET: 0
OCCASIONAL FEELINGS OF UNSTEADINESS: 0
DEPRESSION: 0

## 2024-05-08 ASSESSMENT — PAIN SCALES - GENERAL: PAINLEVEL_OUTOF10: 7

## 2024-05-08 ASSESSMENT — PAIN DESCRIPTION - DESCRIPTORS: DESCRIPTORS: SHARP;TIGHTNESS

## 2024-05-08 NOTE — PROGRESS NOTES
Physical Therapy    Physical Therapy Evaluation    Patient Name: Rosita Treviño  MRN: [id number]  Today's Date: 5/8/2024  Time Calculation  Start Time: 1110  Stop Time: 1150  Time Calculation (min): 40 min    Assessment  PT Assessment Results: Decreased strength, Decreased range of motion, Pain (Multiple comorbidities)  Assessment Comment: Rosita comes to PT with a very complex medical history. Her chief complaint is of neck pain that radiates to her left shoulder. She reports a history of bilateral lymphedema, which may be contributory. She was encouraged to pursue lymphedema therapy. We will work to address her upper quarter/ cervical spine deficits to see if she can achieve improved pain control and self management for improved daily function.    Plan  Treatment/Interventions: Education/ Instruction, Hot pack, Manual therapy, Therapeutic activities, Therapeutic exercises  PT Plan: Skilled PT  PT Frequency: 2 times per week  Duration: 8 weeks; pending progress    Current Problem  1. Neck pain  Referral to Physical Therapy    Follow Up In Physical Therapy      2. Decreased ROM of neck  Follow Up In Physical Therapy      3. Poor posture  Follow Up In Physical Therapy          Subjective   General:  General  Reason for Referral: Neck Pain  Referred By: Dr Cynthia Rees  Past Medical History Relevant to Rehab: Extensive PMH- reviewed in [facility] and on Rehab intake form. Long history of neck pain; gradually worsening. No recent cervical xrays are noted in Epic. (order present).  General Comment: Rosita comes to PT with complaints of neck pain for at least 3 years and has been progressively worsening. She now even has difficulty holding her head up. She now reports left UE pain as well; and sometimes some right wrist/ hand pain.  She is unsure if some of it may be related to lymphedema. She feels that having to carry her heavy portable oxygen tank is contributing to her pain; is trying to get a lighter tank. We reviewed her  significant medical history at length.  Precautions:  Precautions  STEADI Fall Risk Score (The score of 4 or more indicates an increased risk of falling): 0  Medical Precautions:  (On 3 liters ox, carries heavy tank (about 8lbs))  Precautions Comment: Bilateal UE lymphedema; COPD on 3 liters Ox, Chronic Heart Failure, Epilepsy    Pain:  Pain Assessment: 0-10  Pain Score: 7  Pain Type: Chronic pain  Pain Location: Neck  Pain Orientation: Mid, Left  Pain Radiating Towards: left shoulder and arm  Pain Descriptors: Sharp, Tightness  Pain Frequency: Constant/continuous  Pain Interventions:  (Ibuprofen or Tylenol arthritis; Heat on neck, massage chair)  Response to Interventions: Feels a little relaxed  with heat, massge chair  Home Living:  Home Living Comment: Lives with daughter ; 3rd floor apartment.  Apt is single story.  Prior Function Per Pt/Caregiver Report:  Level of [place]:  (INdependent and drives.)  Vocational: Retired  Prior Function Comments: Independent    Objective   Cervical Spine    Observation  Cervical Posture: Upper; mid and lower cervical flexion at rest; signficant forward posture.  Shoulder/Scapular/Rib Position : Flattened through thoracic spine, rounded shoulders, scap protraction  C-Spine Observation Comment: Prominent T1, T2, T3 decreased cervical lordosis,  Use of accessory mm for breathing noted. Prominent SCM and scalenes.   C-Spine Palpation/Joint Mobility Assessment   C-Spine Palpation/Joint Mobility Assessment:: Bilateral cervical parapsinals, upper traps tendern to touch and very tight. Tender to touch over mid thoracic spinous processes. Tender over left olecranon, and enlarged olecranon bursa.  Cervical AROM  Cervical flexion: (80°): 60 deg- pulls in left upper trap  Cervical extension: (50°): 40 deg, sharp shooting pain on right  Cervical rotation right: (80°): 50 deg  Cervical rotation left: (80°): 42 deg  Cervical sidebend right: (45°): 10 deg  Cervical sidebend left: (45°):  "10 deg  Difficulty performing a neck retraction and reports feeling some light \"dizziness\".   Shoulder AROM  R shoulder flexion: (180°): 90 deg  L shoulder flexion: (180°): 90 deg  R shoulder abduction: (180°): 80 deg  L shoulder abduction: (180°): 80 deg  Cervical Strength  Longus coli strength:  (Normal Values Male normal 38.9 seconds; Female 29.4 seconds): unable to test  Myotomes (MMT)  R Scapular Elevation (C4 ): (5/5): 4+/5 P!  L Scapular Elevation (C4 ): (5/5): 4+/5 P!  R Shoulder Abduction (C5 ): (5/5): 2+  L Shoulder Abduction (C5 ): (5/5): 2+  R Elbow Flexion (C6 ): (5/5): 4+  L Elbow Flexion (C6 ): (5/5): 4+  R Wrist Extension (C6 ): (5/5): 4+  L Wrist Extension (C6 ): (5/5): 4-  R Elbow Extension (C7 ): (5/5): 4  L Elbow Extension (C7 ): (5/5): 3+  R Wrist Flexion (C7 ): (5/5): 4+  L Wrist Flexion (C7 ): (5/5): 4- p!  Scapular (MMT)  R middle trapezius: (5/5): 4- P!  L middle trapezius: (5/5): 4- P!    Outcome Measures:  Other Measures  Neck Disability Index: 18 (36%)     OP EDUCATION:  Outpatient Education  Individual(s) Educated: Patient  Education Provided: Body Mechanics, POC, Posture  Risk and Benefits Discussed with Patient/Caregiver/Other: yes  Patient/Caregiver Demonstrated Understanding: yes  Plan of Care Discussed and Agreed Upon: yes  Patient Response to Education: Patient/Caregiver Verbalized Understanding of Information, Patient/Caregiver Performed Return Demonstration of Exercises/Activities, Patient/Caregiver Asked Appropriate Questions    Plan for next session:  Manual therapy/ soft tissue massage, myofascial release.  Postural retraining: scap retractions; retro shoulder rolls; scap depression. Neck retractions as tolerated.   Cervical rotation ROM  Cervical isometrics.    Goals:  Patient Goal: To have less neck and left arm pain, improved neck mobility.    Physical Therapy Goals:  Pain: Will be independent with symptom management strategies and report neck and left UE pain no worse than " 2-3/10 with daily activities.   ROM: Painfree cervical AROM improved to at least 65 deg R/L rotation; painfree neck retraction to neutral for improved ability to see oncoming traffic when driving, and for decreased pain.   Strength: Good cervical strength for improved ability hold head upright in neutral position, and Bilateral UE and scapular mm strength at least 4/5 for improved postural control in standing, and walking.   Posture: Consistently holding head in upright neutral position at rest to decreased neck/ UE pain.

## 2024-05-10 ENCOUNTER — TREATMENT (OUTPATIENT)
Dept: PHYSICAL THERAPY | Facility: CLINIC | Age: 54
End: 2024-05-10
Payer: MEDICARE

## 2024-05-10 DIAGNOSIS — R29.898 DECREASED ROM OF NECK: ICD-10-CM

## 2024-05-10 DIAGNOSIS — M54.2 NECK PAIN: Primary | ICD-10-CM

## 2024-05-10 DIAGNOSIS — R29.3 POOR POSTURE: ICD-10-CM

## 2024-05-10 PROCEDURE — 97110 THERAPEUTIC EXERCISES: CPT | Mod: GP

## 2024-05-10 ASSESSMENT — PAIN SCALES - GENERAL: PAINLEVEL_OUTOF10: 7

## 2024-05-10 ASSESSMENT — PAIN - FUNCTIONAL ASSESSMENT: PAIN_FUNCTIONAL_ASSESSMENT: 0-10

## 2024-05-10 NOTE — PROGRESS NOTES
"Physical Therapy    Physical Therapy Treatment    Patient Name: Rosita Treviño  MRN: [id number]  Today's Date: 5/10/2024  Time Calculation  Start Time: 1530  Stop Time: 1610  Time Calculation (min): 40 min  Visit#2    Assessment   The patient reports experiencing decreased neck tightness following treatment.      Plan   Treatment/Interventions: Education/ Instruction, Hot pack, Manual therapy, Therapeutic activities, Therapeutic exercises     Current Problem  1. Neck pain  Follow Up In Physical Therapy      2. Decreased ROM of neck  Follow Up In Physical Therapy      3. Poor posture  Follow Up In Physical Therapy          Subjective   General:   The patient reports she feels stiff, tight and sore.      Precautions:  Precautions  Precautions Comment: Bilateal UE lymphedema; COPD on 3 liters Ox, Chronic Heart Failure, Epilepsy    Pain:  Pain Assessment: 0-10  Pain Score: 7  Pain Type: Chronic pain  Pain Location: Neck        Objective   Cervical Spine  HAKAN SB AROM = 10 degrees     Outcome Measures:    Not today       Plan for next session:  Manual therapy/ soft tissue massage, myofascial release.  Postural retraining: scap retractions; retro shoulder rolls; scap depression. Neck retractions as tolerated.   Cervical rotation ROM  Cervical isometrics.    Treatments  EXERCISES Date 5/10/24 Date Date Date    REPS REPS REPS REPS   HP HAKAN UTs 10'             Cervical rotation X5 ea 5\"H      Cervical SB  X5 ea 5\"H      Chin Tucks X5 5\"H      Scapular retractions X10 5\"H             ISO Abd              Pulleys Flexion 3'      Pulleys Scaption 3'             Nustep L4 7'                                                                                                                             HEP          Goals:  Patient Goal: To have less neck and left arm pain, improved neck mobility.    Physical Therapy Goals:  Pain: Will be independent with symptom management strategies and report neck and left UE pain no worse than 2-3/10 " with daily activities.   ROM: Painfree cervical AROM improved to at least 65 deg R/L rotation; painfree neck retraction to neutral for improved ability to see oncoming traffic when driving, and for decreased pain.   Strength: Good cervical strength for improved ability hold head upright in neutral position, and Bilateral UE and scapular mm strength at least 4/5 for improved postural control in standing, and walking.   Posture: Consistently holding head in upright neutral position at rest to decreased neck/ UE pain.

## 2024-05-14 ENCOUNTER — TELEPHONE (OUTPATIENT)
Dept: PULMONOLOGY | Facility: HOSPITAL | Age: 54
End: 2024-05-14
Payer: MEDICARE

## 2024-05-15 ENCOUNTER — DOCUMENTATION (OUTPATIENT)
Dept: PHYSICAL THERAPY | Facility: HOSPITAL | Age: 54
End: 2024-05-15
Payer: MEDICARE

## 2024-05-15 ENCOUNTER — APPOINTMENT (OUTPATIENT)
Dept: PHYSICAL THERAPY | Facility: CLINIC | Age: 54
End: 2024-05-15
Payer: MEDICARE

## 2024-05-15 ENCOUNTER — APPOINTMENT (OUTPATIENT)
Dept: PRIMARY CARE | Facility: HOSPITAL | Age: 54
End: 2024-05-15
Payer: MEDICARE

## 2024-05-15 ENCOUNTER — HOSPITAL ENCOUNTER (OUTPATIENT)
Dept: RADIOLOGY | Facility: EXTERNAL LOCATION | Age: 54
Discharge: HOME | End: 2024-05-15
Payer: MEDICARE

## 2024-05-15 DIAGNOSIS — M79.671 RIGHT FOOT PAIN: ICD-10-CM

## 2024-05-15 NOTE — PROGRESS NOTES
Physical Therapy                 Therapy Communication Note    Patient Name: Rosita Treviño  MRN: [id number]  Today's Date: 5/15/2024     Discipline: Physical Therapy    Missed Visit Reason:      Missed Time: Cancel    Comment: Pt cancelled due to reports she is having foot and ankle pain.

## 2024-05-20 ENCOUNTER — DOCUMENTATION (OUTPATIENT)
Dept: PHYSICAL THERAPY | Facility: HOSPITAL | Age: 54
End: 2024-05-20
Payer: MEDICARE

## 2024-05-20 ENCOUNTER — APPOINTMENT (OUTPATIENT)
Dept: PHYSICAL THERAPY | Facility: CLINIC | Age: 54
End: 2024-05-20
Payer: MEDICARE

## 2024-05-20 NOTE — PROGRESS NOTES
Physical Therapy                 Therapy Communication Note    Patient Name: Rosita Treviño  MRN: [id number]  Today's Date: 5/20/2024     Discipline: Physical Therapy    Missed Visit Reason:      Missed Time: Cancel    Comment: Waiting on Oxygen delivery

## 2024-05-23 ENCOUNTER — DOCUMENTATION (OUTPATIENT)
Dept: PHYSICAL THERAPY | Facility: HOSPITAL | Age: 54
End: 2024-05-23
Payer: MEDICARE

## 2024-05-23 NOTE — PROGRESS NOTES
Physical Therapy                 Therapy Communication Note    Patient Name: Rosita Treviño  MRN: [id number]  Today's Date: 5/23/2024     Discipline: Physical Therapy    Missed Visit Reason:  Left message    Missed Time: No Show    Comment:

## 2024-05-28 ENCOUNTER — APPOINTMENT (OUTPATIENT)
Dept: PHYSICAL THERAPY | Facility: CLINIC | Age: 54
End: 2024-05-28
Payer: MEDICARE

## 2024-05-31 ENCOUNTER — APPOINTMENT (OUTPATIENT)
Dept: PHYSICAL THERAPY | Facility: CLINIC | Age: 54
End: 2024-05-31
Payer: MEDICARE

## 2024-06-04 ENCOUNTER — DOCUMENTATION (OUTPATIENT)
Dept: PHYSICAL THERAPY | Facility: CLINIC | Age: 54
End: 2024-06-04
Payer: MEDICARE

## 2024-06-04 NOTE — PROGRESS NOTES
Physical Therapy                 Therapy Communication Note    Patient Name: Rosita Treviño  MRN: [id number]  Today's Date: 6/4/2024     Discipline: Physical Therapy    Missed Visit Reason:      Missed Time: No Show    Comment:

## 2024-06-06 ENCOUNTER — DOCUMENTATION (OUTPATIENT)
Dept: PHYSICAL THERAPY | Facility: CLINIC | Age: 54
End: 2024-06-06
Payer: MEDICARE

## 2024-06-06 NOTE — PROGRESS NOTES
Physical Therapy    Discharge Summary    Name: Rosita Treviño  MRN: [id number]  : 1970  Date: 24    Discharge Summary: PT    Discharge Information: Date of discharge 24, Date of last visit 5/10/24, Date of evaluation 24, Number of attended visits 2, Referred by Dr Cynthia Rees, and Referred for Neck Pain    Therapy Summary: Rosita attended her PT eval and one follow up visit. She cancelled or did not show for remaining visits.    Discharge Status: Unknown. Rosita was called/ messages left when she did not show for visits and she did not return calls.     Rehab Discharge Reason: Failed to schedule and/or keep follow-up appointment(s)

## 2024-06-14 ENCOUNTER — APPOINTMENT (OUTPATIENT)
Dept: RHEUMATOLOGY | Facility: CLINIC | Age: 54
End: 2024-06-14
Payer: MEDICARE

## 2024-06-17 ENCOUNTER — APPOINTMENT (OUTPATIENT)
Dept: OPHTHALMOLOGY | Facility: CLINIC | Age: 54
End: 2024-06-17
Payer: MEDICARE

## 2024-09-09 ENCOUNTER — APPOINTMENT (OUTPATIENT)
Dept: SLEEP MEDICINE | Facility: CLINIC | Age: 54
End: 2024-09-09
Payer: MEDICARE

## 2024-10-08 ENCOUNTER — TELEPHONE (OUTPATIENT)
Dept: PULMONOLOGY | Facility: HOSPITAL | Age: 54
End: 2024-10-08
Payer: COMMERCIAL

## 2024-10-08 DIAGNOSIS — I50.32 CHRONIC HEART FAILURE WITH PRESERVED EJECTION FRACTION: ICD-10-CM

## 2024-10-08 NOTE — TELEPHONE ENCOUNTER
Sent the following message to Yumiko Morataya CNP:This patient is complaining of SOB, MODI, difficulty taking a deep breath, voice hoarseness, chest pain/tightness, wheezing, ankle edema, and feeling like she is starting to retain fluids. She takes 40mg of Torsemide twice daily.  Informed the patient that per Yumiko, Her symptoms sound more heart related and she should call her cardiologist.  Patient expressed understanding and appreciation for the call.  She also stated she will contact her cardiologist right away.

## 2024-10-09 RX ORDER — METOLAZONE 5 MG/1
TABLET ORAL
Qty: 12 TABLET | Refills: 1 | Status: SHIPPED | OUTPATIENT
Start: 2024-10-09

## 2024-10-10 ENCOUNTER — OFFICE VISIT (OUTPATIENT)
Dept: CARDIOLOGY | Facility: HOSPITAL | Age: 54
End: 2024-10-10
Payer: MEDICARE

## 2024-10-10 ENCOUNTER — APPOINTMENT (OUTPATIENT)
Dept: CARDIOLOGY | Facility: HOSPITAL | Age: 54
End: 2024-10-10
Payer: COMMERCIAL

## 2024-10-10 VITALS
WEIGHT: 205 LBS | DIASTOLIC BLOOD PRESSURE: 92 MMHG | OXYGEN SATURATION: 92 % | SYSTOLIC BLOOD PRESSURE: 145 MMHG | HEIGHT: 62 IN | BODY MASS INDEX: 37.73 KG/M2 | HEART RATE: 92 BPM

## 2024-10-10 DIAGNOSIS — I50.32 CHRONIC DIASTOLIC HEART FAILURE: Primary | ICD-10-CM

## 2024-10-10 DIAGNOSIS — I34.0 NONRHEUMATIC MITRAL (VALVE) INSUFFICIENCY: ICD-10-CM

## 2024-10-10 PROCEDURE — 3077F SYST BP >= 140 MM HG: CPT | Performed by: INTERNAL MEDICINE

## 2024-10-10 PROCEDURE — 99214 OFFICE O/P EST MOD 30 MIN: CPT | Performed by: INTERNAL MEDICINE

## 2024-10-10 PROCEDURE — 93005 ELECTROCARDIOGRAM TRACING: CPT | Performed by: INTERNAL MEDICINE

## 2024-10-10 PROCEDURE — 1036F TOBACCO NON-USER: CPT | Performed by: INTERNAL MEDICINE

## 2024-10-10 PROCEDURE — 3080F DIAST BP >= 90 MM HG: CPT | Performed by: INTERNAL MEDICINE

## 2024-10-10 PROCEDURE — 3008F BODY MASS INDEX DOCD: CPT | Performed by: INTERNAL MEDICINE

## 2024-10-10 RX ORDER — DAPAGLIFLOZIN 10 MG/1
10 TABLET, FILM COATED ORAL DAILY
Qty: 30 TABLET | Refills: 11 | Status: SHIPPED | OUTPATIENT
Start: 2024-10-10 | End: 2025-10-10

## 2024-10-10 RX ORDER — EPLERENONE 50 MG/1
50 TABLET, FILM COATED ORAL DAILY
Qty: 30 TABLET | Refills: 11 | Status: SHIPPED | OUTPATIENT
Start: 2024-10-10 | End: 2025-10-10

## 2024-10-10 RX ORDER — POTASSIUM CHLORIDE 20 MEQ/1
20 TABLET, EXTENDED RELEASE ORAL DAILY
COMMUNITY

## 2024-10-10 ASSESSMENT — PAIN SCALES - GENERAL: PAINLEVEL: 0-NO PAIN

## 2024-10-10 NOTE — PATIENT INSTRUCTIONS
It was a pleasure seeing you today. Please contact myself or my team with any questions.     To reach Dr. Hilton' office please call [phone] ([place]).   Fax: [phone]   To schedule an appointment call [phone]     If you have any questions or need cardiac medication refills, please call the Heart Failure office at [phone], option 6. You may also contact the  Heart Failure Nursing team via email at [email or web address] (Please include your name and date of birth).        1) Start farxiga 10 mg once a day  2) Start eplerenone 50 mg once a day  3) Labs in 7-10 days (RFP/BNP)  4) Follow up in 3 months at /[facility]

## 2024-10-10 NOTE — PROGRESS NOTES
[facility] Advanced Heart Failure Clinic  Primary Care Physician: Nadia Olsen MD  Referring Provider/Cardiologist: n/a    Date of Visit: 10/10/2024  1:00 PM EDT  Location of visit: [facility]     HPI:   Ms. 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     Interval hx:   Patient has complaints of intermittent chest pain. This lasts a few seconds and resolves on its own. Patient has complaints of palpitations, dyspnea on exertion, orthopnea, positive PND. Patient denies SOB at rest. Edema noted in BLE. Patient has complaints of headaches, dizziness. Patient denies recent falls. She has complaints of fatigue.     Hospitalizations: 7/1/23 ED for shoulder pain     SocHx:   Denies smoking, ETOH, illicits    FamHx:   Both parents had cardiac disease       Current Outpatient Medications   Medication Sig Dispense Refill    albuterol (Ventolin HFA) 90 mcg/actuation inhaler Inhale 2 puffs every 4 hours if needed for wheezing or shortness of breath. 18 g 3    albuterol 0.63 mg/3 mL nebulizer solution Take 3 mL (0.63 mg) by nebulization every 4 hours if needed.      budesonide-formoteroL (Symbicort) 160-4.5 mcg/actuation inhaler Inhale 2 puffs 2 times a day. 1 each 11    cyanocobalamin (Vitamin B-12) 1,000 mcg tablet Take 1 tablet (1,000 mcg) by mouth once daily.      ipratropium-albuteroL (Duo-Neb) 0.5-2.5 mg/3 mL nebulizer solution USE 1 VIAL IN NEBULIZER EVERY 8 HOURS AS DIRECTED 45 mL 0    levETIRAcetam (Keppra) 500 mg tablet Take 1 tablet (500 mg) by mouth 2 times a day. 180 tablet 3    metOLazone (Zaroxolyn) 5 mg tablet TAKE 1 TABLET BY MOUTH 1 TIME A WEEK ON  "SUNDAYS 12 tablet 1    metoprolol succinate XL (Toprol-XL) 100 mg 24 hr tablet Take 1 tablet (100 mg) by mouth once daily. 90 tablet 3    oxygen (O2) therapy 3L BiPAP BLEED NIGHTLY      potassium chloride CR 20 mEq ER tablet Take 1 tablet (20 mEq) by mouth once daily. Do not crush or chew.      torsemide (Demadex) 20 mg tablet Take 2 tablets (40 mg) by mouth 2 times a day. 120 tablet 11    dapagliflozin propanediol (Farxiga) 10 mg TAKE 1 TABLET BY MOUTH DAILY (Patient not taking: Reported on 10/10/2024) 5 tablet 0     No current facility-administered medications for this visit.       Allergies   Allergen Reactions    House Dust Cough    Iodinated Contrast Media Itching    Irbesartan Unknown    Salicylates Unknown    Aspirin Rash    Gadolinium-Containing Contrast Media Rash    Penicillins Rash and Hives         Visit Vitals  BP (!) 145/92 (BP Location: Right arm, Patient Position: Sitting)   Pulse 92   Ht 1.575 m (5' 2\")   Wt 93 kg (205 lb)   SpO2 92% Comment: 3lpm   BMI 37.49 kg/m²   Smoking Status Never   BSA 2.02 m²        Physical Exam:  On exam Ms. Treviño appears her stated age, is alert and oriented x3, and in no acute distress. Her sclera are anicteric and her oropharynx has moist mucous membranes. She is wearing nasal cannula oxygen. Her neck is supple and without thyromegaly. The JVP is ~12 cm of water above the right atrium. Her cardiac exam has regular rhythm, normal S1, S2. No S3/4. There are no murmurs. Her lungs are clear to auscultation bilaterally and there is no dullness to percussion. Her abdomen is soft, nontender with normoactive bowel sounds. There is no HJR. The extremities are warm and with 1-2+ pitting edema. The skin is dry. There is no rash present. The distal pulses are 2+ in all four extremities. Her mood and affect are appropriate for todays encounter.       Cardiac Labs/Diagnostics:    Lab Results   Component Value Date    CREATININE 0.91 01/24/2024    BUN 16 01/24/2024     " 01/24/2024    K 3.9 01/24/2024    CL 98 01/24/2024    CO2 35 (H) 01/24/2024        Recent Labs     01/24/24  1634 10/21/22  1549 05/12/22  0524 11/28/17  1315   CHOL 165 122 124 156   LDLF  --  63  --  100*   LDLCALC 105*  --   --   --    HDL 48.0 35.8* 28.3* 42.9   TRIG 62 115  --  64       Recent Labs     09/10/23  1152 09/04/23  1750 07/01/23  1630 09/28/22  2047 05/11/22  1025   * 1,003* 465* 492* 456*       ECG (10/10/24):  Sinus rhythm (HR 87), LVH, LAD    Echo (9/29/23):  1. Left ventricular systolic function is normal with a 60-65% estimated ejection fraction.  2. There is mildly reduced right ventricular systolic function.  3. Moderate to severe mitral valve regurgitation.  4. There is moderately restricted tricuspid valve leaflet mobility.  5. Moderately elevated right ventricular systolic pressure.  6. Severe tricuspid regurgitation visualized.  7. There is malcoaptation of the TV leaflets and central tricuspid regurgitation.  8. Mild aortic valve regurgitation.    Echo (9/29/22):  1. Left ventricular systolic function is normal with a 55-60% estimated ejection fraction.  2. Spectral Doppler shows an impaired relaxation pattern of left ventricular diastolic filling.  3. Moderately enlarged right ventricle.  4. There is mildly reduced right ventricular systolic function.  5. Moderate tricuspid regurgitation visualized  6. The inferior vena cava appears moderately dilated. There is poor inspiratory collapse of the IVC (less than 50%), consistent with elevated right atrial pressure.    Echo (5/13/22):  1. The left ventricular systolic function is normal with a 55-60% estimated ejection fraction.  2. Poorly visualized anatomical structures due to suboptimal image quality.  3. Mildly elevated RVSP.  4. There is mild to moderate tricuspid regurgitation.  5. There is mild aortic valve regurgitation.  6. The left ventricular cavity size is decreased.       Impression/Plan:  Ms. 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 At the current time she has functional class III symptoms and is hypervolemic on exam.     1) Stage C acute on chronic diastolic HF/HFpEF (LVEF 60-65%; 9/2023) with associated RV dysfunction  Remains symptomatic with an elevated BNP; most recent  (7/1/23). She did not start SGLT2 after the last appt; she is willing to try now. She is also no longer on eplerenone for unclear reasons. She is also not interested in CardioMEMs.   -c/w torsemide 40 mg bid  -c/w metolazone 5 mg week, KCl 100 mEq total (2 tabs am, 1 midday, 2 pm)  -resume eplerenone 50 mg daily  -start farxiga 10 mg daily  -repeat labs in 7-10 days (RFP/BNP)    2) Palpitations  -c/w metoprolol succinate 100 mg daily    3) Mitral regurgitation  Once euvolemic, will reassess; pending results, will evaluate for VALERIO.      F/U: 3 months at /[facility] 1800      ____________________________________________________________  Hans Hilton DO  Section of Advanced Heart Failure and Cardiac Transplantation  Division of Cardiovascular Medicine  [facility]  [facility]

## 2024-10-11 LAB
ATRIAL RATE: 87 BPM
P AXIS: 65 DEGREES
P OFFSET: 186 MS
P ONSET: 136 MS
PR INTERVAL: 148 MS
Q ONSET: 210 MS
QRS COUNT: 15 BEATS
QRS DURATION: 114 MS
QT INTERVAL: 400 MS
QTC CALCULATION(BAZETT): 481 MS
QTC FREDERICIA: 452 MS
R AXIS: -37 DEGREES
T AXIS: 32 DEGREES
T OFFSET: 410 MS
VENTRICULAR RATE: 87 BPM

## 2024-10-22 DIAGNOSIS — I50.32 CHRONIC DIASTOLIC HEART FAILURE: ICD-10-CM

## 2024-10-30 ENCOUNTER — APPOINTMENT (OUTPATIENT)
Dept: PRIMARY CARE | Facility: HOSPITAL | Age: 54
End: 2024-10-30
Payer: MEDICARE

## 2025-01-02 ENCOUNTER — APPOINTMENT (OUTPATIENT)
Dept: SLEEP MEDICINE | Facility: HOSPITAL | Age: 55
End: 2025-01-02
Payer: MEDICARE

## 2025-01-09 ENCOUNTER — OFFICE VISIT (OUTPATIENT)
Dept: SLEEP MEDICINE | Facility: HOSPITAL | Age: 55
End: 2025-01-09
Payer: MEDICARE

## 2025-01-09 VITALS
BODY MASS INDEX: 38.59 KG/M2 | HEART RATE: 101 BPM | OXYGEN SATURATION: 100 % | SYSTOLIC BLOOD PRESSURE: 114 MMHG | DIASTOLIC BLOOD PRESSURE: 64 MMHG | TEMPERATURE: 98 F | WEIGHT: 211 LBS

## 2025-01-09 DIAGNOSIS — G47.33 OSA (OBSTRUCTIVE SLEEP APNEA): Primary | ICD-10-CM

## 2025-01-09 DIAGNOSIS — R09.02 HYPOXIA: ICD-10-CM

## 2025-01-09 DIAGNOSIS — J44.9 CHRONIC OBSTRUCTIVE PULMONARY DISEASE, UNSPECIFIED COPD TYPE (MULTI): ICD-10-CM

## 2025-01-09 DIAGNOSIS — I50.30 HEART FAILURE WITH PRESERVED EJECTION FRACTION, UNSPECIFIED HF CHRONICITY: ICD-10-CM

## 2025-01-09 DIAGNOSIS — G47.33 OBSTRUCTIVE SLEEP APNEA: ICD-10-CM

## 2025-01-09 PROCEDURE — 99214 OFFICE O/P EST MOD 30 MIN: CPT | Performed by: STUDENT IN AN ORGANIZED HEALTH CARE EDUCATION/TRAINING PROGRAM

## 2025-01-09 PROCEDURE — G2211 COMPLEX E/M VISIT ADD ON: HCPCS | Performed by: STUDENT IN AN ORGANIZED HEALTH CARE EDUCATION/TRAINING PROGRAM

## 2025-01-09 PROCEDURE — 1036F TOBACCO NON-USER: CPT | Performed by: STUDENT IN AN ORGANIZED HEALTH CARE EDUCATION/TRAINING PROGRAM

## 2025-01-09 PROCEDURE — 3074F SYST BP LT 130 MM HG: CPT | Performed by: STUDENT IN AN ORGANIZED HEALTH CARE EDUCATION/TRAINING PROGRAM

## 2025-01-09 PROCEDURE — 3078F DIAST BP <80 MM HG: CPT | Performed by: STUDENT IN AN ORGANIZED HEALTH CARE EDUCATION/TRAINING PROGRAM

## 2025-01-09 RX ORDER — METOPROLOL SUCCINATE 100 MG/1
100 TABLET, EXTENDED RELEASE ORAL DAILY
Qty: 90 TABLET | Refills: 3 | Status: SHIPPED | OUTPATIENT
Start: 2025-01-09

## 2025-01-09 ASSESSMENT — PAIN SCALES - GENERAL: PAINLEVEL_OUTOF10: 0-NO PAIN

## 2025-01-10 ENCOUNTER — TELEPHONE (OUTPATIENT)
Dept: SLEEP MEDICINE | Facility: HOSPITAL | Age: 55
End: 2025-01-10
Payer: COMMERCIAL

## 2025-01-10 ASSESSMENT — SLEEP AND FATIGUE QUESTIONNAIRES
HOW LIKELY ARE YOU TO NOD OFF OR FALL ASLEEP WHILE LYING DOWN TO REST IN THE AFTERNOON WHEN CIRCUMSTANCES PERMIT: SLIGHT CHANCE OF DOZING
HOW LIKELY ARE YOU TO NOD OFF OR FALL ASLEEP WHILE SITTING QUIETLY AFTER LUNCH WITHOUT ALCOHOL: HIGH CHANCE OF DOZING
HOW LIKELY ARE YOU TO NOD OFF OR FALL ASLEEP WHEN YOU ARE A PASSENGER IN A CAR FOR AN HOUR WITHOUT A BREAK: HIGH CHANCE OF DOZING
SLEEP_PROBLEM_INTERFERES_DAILY_ACTIVITIES: NOT AT ALL NOTICEABLE
HOW LIKELY ARE YOU TO NOD OFF OR FALL ASLEEP WHILE SITTING AND READING: HIGH CHANCE OF DOZING
SATISFACTION_WITH_CURRENT_SLEEP_PATTERN: VERY SATISFIED
HOW LIKELY ARE YOU TO NOD OFF OR FALL ASLEEP IN A CAR, WHILE STOPPED FOR A FEW MINUTES IN TRAFFIC: WOULD NEVER DOZE
WORRIED_DISTRESSED_DUE_TO_SLEEP: NOT AT ALL NOTICEABLE
SITING INACTIVE IN A PUBLIC PLACE LIKE A CLASS ROOM OR A MOVIE THEATER: HIGH CHANCE OF DOZING
HOW LIKELY ARE YOU TO NOD OFF OR FALL ASLEEP WHILE WATCHING TV: HIGH CHANCE OF DOZING
SLEEP_PROBLEM_NOTICEABLE_TO_OTHERS: NOT AT ALL NOTICEABLE
HOW LIKELY ARE YOU TO NOD OFF OR FALL ASLEEP WHILE SITTING AND TALKING TO SOMEONE: SLIGHT CHANCE OF DOZING
ESS-CHAD TOTAL SCORE: 17

## 2025-01-10 ASSESSMENT — ENCOUNTER SYMPTOMS
CONSTITUTIONAL NEGATIVE: 1
PSYCHIATRIC NEGATIVE: 1
CARDIOVASCULAR NEGATIVE: 1
NEUROLOGICAL NEGATIVE: 1
RESPIRATORY NEGATIVE: 1

## 2025-01-10 NOTE — ASSESSMENT & PLAN NOTE
- doing well with PAP therapy.  She has oxygen @ 3LPM bleed into the CPAP circuit for her.  I will need to review her download from [place].    - continue current setting  - renew PAP supply orders

## 2025-01-10 NOTE — PROGRESS NOTES
Patient: Rosita Treviño    [id number]  : 1970 -- AGE 54 y.o.    Provider: Ryan Fitch MD     Location [facility]   Service Date: 1/10/2025              [facility]  Followup Visit Note        ASSESSMENT/PLAN     Ms. Treviño is a 54 y.o. female and she returns in followup to the [facility] for the problems listed below on 01/10/25     Problem List, Orders, Assessment, Recommendations:  Problem List Items Addressed This Visit             ICD-10-CM    Hypoxia R09.02     She got pretty hypoxic with exertion just with walking into the clinic room.  On her portable oxygen, she went down to 70+%.  When hooked up on the tank, her O2 sat gradually climbed back up to the 90's     She also needs to make a follow-up appointment with her pulm provider         Chronic obstructive pulmonary disease (Multi) J44.9    Obstructive sleep apnea G47.33     - doing well with PAP therapy.  She has oxygen @ 3LPM bleed into the CPAP circuit for her.  I will need to review her download from [employer].    - continue current setting  - renew PAP supply orders            Other Visit Diagnoses         Codes    ENRIQUE (obstructive sleep apnea)    -  Primary G47.33    Relevant Orders    Positive Airway Pressure (PAP) Therapy          Disposition  Return to clinic in 12 months       HISTORY OF PRESENT ILLNESS     HISTORY OF PRESENT ILLNESS   Rosita Treviño is a 54 y.o. female with h/o ENRIQUE and COPD  who presents to a [facility] for followup.     Assessment and plan from last visit: 4/3/2023    53 yo female with h/o HTN, ENRIQUE, Obesity, COPD presented today to establish care     # ENRIQUE/Nocturnal Hypoxia  - previously diagnosed with ENRIQUE; severe with AHI ~ 33 (), then another diagnositc study showing mod ENRIQUE with AHI ~ 25  - has been using CPAP ([employer]) with 3 LPM oxygen bleed in  - she has no problem using PAP + oxygen therapy to sleep  -  will obtain download from [employer] to evaluate pressure setting and therapy efficacy  - continue current PAP therapy     # Insufficient sleep/inadequate sleep hygiene  - she goes to bed at around 2 am, with a lot of TV watching  - then wakes up around 6 am and may or may not go back to sleep.  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     # HTN  - /63 today  - no headache, blurry vision, chest pain, palpitation, dizziness, syncopal episodes   - continue to f/u with PCP      # Obesity  - current BMI 39  - Encouraged continuing healthy weight loss via diet and exercise  - continue to f/u with PCP      RTC in 4 months     Current History    On today's visit, the patient reports that she has been doing ok with her CPAP and O2 therapy.  She moved to be with her daughter around [facility] so it's difficult for her to make it to her appointments.  She last saw me almost 2 years ago.  She got pretty hypoxic with exertion just with walking into the clinic room.  On her portable oxygen, she went down to 70+%.  When hooked up on the tank, her O2 sat gradually climbed back up to the 90's.    PAP Info  DURABLE MEDICAL EQUIPMENT COMPANY: [employer]  Issues with therapy: ISSUES WITH THERAPY: None  Benefits with PAP: PERCEIVED BENEFITS OF PAP: refreshing sleep    PAP Adherence  Not available at the time of clinic visit, but will obtain from [employer]    RLS Followup:   none.    Daytime Symptoms    Patient reports DAYTIME SYMPTOMS: excessively sleepy during the day    Naps: dozing a lot  Fatigue: denies feeling fatigue    ESS: 17  TATIANA: 0  FOSQ: 38    REVIEW OF SYSTEMS     REVIEW OF SYSTEMS  Review of Systems   Constitutional: Negative.    HENT: Negative.     Respiratory: Negative.     Cardiovascular:  Negative.    Genitourinary: Negative.    Skin: Negative.    Neurological: Negative.    Psychiatric/Behavioral: Negative.           ALLERGIES AND MEDICATIONS     ALLERGIES  Allergies   Allergen Reactions    House Dust Cough    Iodinated Contrast Media Itching    Irbesartan Unknown    Salicylates Unknown    Aspirin Rash    Gadolinium-Containing Contrast Media Rash    Penicillins Rash and Hives       MEDICATIONS: She has a current medication list which includes the following prescription(s): albuterol - Inhale 2 puffs every 4 hours if needed for wheezing or shortness of breath, albuterol - Take 3 mL (0.63 mg) by nebulization every 4 hours if needed, budesonide-formoterol - Inhale 2 puffs 2 times a day, cyanocobalamin - Take 1 tablet (1,000 mcg) by mouth once daily, dapagliflozin propanediol - TAKE 1 TABLET BY MOUTH DAILY, eplerenone - Take 1 tablet (50 mg) by mouth once daily, metolazone - TAKE 1 TABLET BY MOUTH 1 TIME A WEEK ON SUNDAYS, metoprolol succinate xl - TAKE 1 TABLET BY MOUTH EVERY DAY, oxygen dme/hospice - 3L BiPAP BLEED NIGHTLY, potassium chloride cr - Take 1 tablet (20 mEq) by mouth once daily. Do not crush or chew, torsemide - Take 2 tablets (40 mg) by mouth 2 times a day, ipratropium-albuterol - USE 1 VIAL IN NEBULIZER EVERY 8 HOURS AS DIRECTED, and levetiracetam - Take 1 tablet (500 mg) by mouth 2 times a day.    PAST MEDICAL HISTORY : She  has a past medical history of Acute atopic conjunctivitis, bilateral (08/28/2017), Acute upper respiratory infection, unspecified (01/04/2017), Anesthesia of skin (06/19/2015), Candidiasis of skin and nail (11/21/2013), Dietary folate deficiency anemia (06/22/2016), Dizziness and giddiness (12/20/2016), Encounter for screening for human immunodeficiency virus (HIV) (01/06/2016), Epilepsy, unspecified, not intractable, without status epilepticus (08/28/2017), Hodgkin lymphoma, unspecified, unspecified site (Multi) (07/27/2013), Localized edema (06/24/2016),  Obstructive sleep apnea (adult) (pediatric) (09/12/2014), Other polyuria (07/06/2016), Other specified cough (05/01/2018), Other specified disorders of the skin and subcutaneous tissue (09/12/2014), Pain in left shoulder (05/21/2015), Pain in unspecified ankle and joints of unspecified foot (11/18/2015), Pain in unspecified foot (11/19/2015), Personal history of diseases of the blood and blood-forming organs and certain disorders involving the immune mechanism (01/12/2016), Personal history of Hodgkin lymphoma (06/08/2022), Personal history of non-Hodgkin lymphomas (03/09/2017), Personal history of other diseases of the circulatory system (10/25/2013), Personal history of other diseases of the respiratory system (01/15/2018), Personal history of other diseases of the respiratory system (09/12/2014), Personal history of other diseases of the respiratory system (08/24/2016), Personal history of other endocrine, nutritional and metabolic disease (11/28/2017), Personal history of other endocrine, nutritional and metabolic disease (12/20/2016), Personal history of other specified conditions (08/24/2016), Personal history of other specified conditions (11/30/2015), Personal history of other specified conditions (08/28/2017), Personal history of other specified conditions (07/11/2017), Personal history of other specified conditions (06/22/2016), Personal history of pneumonia (recurrent) (01/12/2016), 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    PAST SURGICAL HISTORY: She  has a past surgical history that includes Hysterectomy (11/28/2017) and Mastectomy (07/16/2014).     FAMILY HISTORY: No changes since previous visit. Otherwise non-contributory as charted.     SOCIAL HISTORY  She  reports that she has never smoked. She has never used smokeless tobacco. She reports that  she does not drink alcohol and does not use drugs.       PHYSICAL EXAM     VITAL SIGNS: /64   Pulse 101   Temp 36.7 °C (98 °F)   Wt 95.7 kg (211 lb)   SpO2 100% Comment: 3L  BMI 38.59 kg/m²      PREVIOUS WEIGHTS:  Wt Readings from Last 3 Encounters:   01/09/25 95.7 kg (211 lb)   10/10/24 93 kg (205 lb)   04/26/24 87.5 kg (193 lb)         RESULTS/DATA     Bicarbonate (mmol/L)   Date Value   01/24/2024 35 (H)   11/15/2023 >45 (HH)   10/16/2023 45 (HH)     Iron (ug/dL)   Date Value   05/16/2023 78   10/21/2022 44     Iron Saturation (%)   Date Value   05/16/2023 18 (L)   10/21/2022 10 (L)     TIBC (ug/dL)   Date Value   05/16/2023 429   10/21/2022 455 (H)     Ferritin (ug/L)   Date Value   05/16/2023 38   10/21/2022 26

## 2025-01-10 NOTE — ASSESSMENT & PLAN NOTE
She got pretty hypoxic with exertion just with walking into the clinic room.  On her portable oxygen, she went down to 70+%.  When hooked up on the tank, her O2 sat gradually climbed back up to the 90's     She also needs to make a follow-up appointment with her pulm provider

## 2025-01-23 ENCOUNTER — APPOINTMENT (OUTPATIENT)
Dept: CARDIOLOGY | Facility: HOSPITAL | Age: 55
End: 2025-01-23
Payer: MEDICARE

## 2025-01-27 DIAGNOSIS — R56.9 SEIZURE (MULTI): ICD-10-CM

## 2025-01-27 RX ORDER — LEVETIRACETAM 500 MG/1
500 TABLET ORAL 2 TIMES DAILY
Qty: 180 TABLET | Refills: 3 | Status: SHIPPED | OUTPATIENT
Start: 2025-01-27 | End: 2026-01-22

## 2025-01-28 ENCOUNTER — DOCUMENTATION (OUTPATIENT)
Dept: PRIMARY CARE | Facility: HOSPITAL | Age: 55
End: 2025-01-28
Payer: COMMERCIAL

## 2025-01-28 ENCOUNTER — OFFICE VISIT (OUTPATIENT)
Dept: PRIMARY CARE | Facility: HOSPITAL | Age: 55
End: 2025-01-28
Payer: COMMERCIAL

## 2025-01-28 VITALS
DIASTOLIC BLOOD PRESSURE: 89 MMHG | OXYGEN SATURATION: 96 % | TEMPERATURE: 97.3 F | WEIGHT: 210.3 LBS | BODY MASS INDEX: 38.7 KG/M2 | HEIGHT: 62 IN | SYSTOLIC BLOOD PRESSURE: 143 MMHG | HEART RATE: 94 BPM

## 2025-01-28 DIAGNOSIS — I50.30 HEART FAILURE WITH PRESERVED EJECTION FRACTION, UNSPECIFIED HF CHRONICITY: ICD-10-CM

## 2025-01-28 DIAGNOSIS — Z00.00 ROUTINE GENERAL MEDICAL EXAMINATION AT A HEALTH CARE FACILITY: Primary | ICD-10-CM

## 2025-01-28 DIAGNOSIS — D64.9 ANEMIA, UNSPECIFIED TYPE: ICD-10-CM

## 2025-01-28 DIAGNOSIS — I50.32 CHRONIC HEART FAILURE WITH PRESERVED EJECTION FRACTION: ICD-10-CM

## 2025-01-28 PROCEDURE — 1036F TOBACCO NON-USER: CPT

## 2025-01-28 PROCEDURE — 3077F SYST BP >= 140 MM HG: CPT

## 2025-01-28 PROCEDURE — 3008F BODY MASS INDEX DOCD: CPT

## 2025-01-28 PROCEDURE — 3079F DIAST BP 80-89 MM HG: CPT

## 2025-01-28 PROCEDURE — 99214 OFFICE O/P EST MOD 30 MIN: CPT | Mod: GC

## 2025-01-28 PROCEDURE — 99214 OFFICE O/P EST MOD 30 MIN: CPT

## 2025-01-28 RX ORDER — TORSEMIDE 20 MG/1
40 TABLET ORAL 2 TIMES DAILY
Qty: 120 TABLET | Refills: 11 | Status: SHIPPED | OUTPATIENT
Start: 2025-01-28

## 2025-01-28 RX ORDER — METOLAZONE 5 MG/1
5 TABLET ORAL WEEKLY
Qty: 12 TABLET | Refills: 3 | Status: SHIPPED | OUTPATIENT
Start: 2025-01-28

## 2025-01-28 RX ORDER — TORSEMIDE 20 MG/1
40 TABLET ORAL 2 TIMES DAILY
Qty: 120 TABLET | Refills: 11 | Status: SHIPPED | OUTPATIENT
Start: 2025-01-28 | End: 2025-01-28 | Stop reason: SDUPTHER

## 2025-01-28 ASSESSMENT — ENCOUNTER SYMPTOMS
LOSS OF SENSATION IN FEET: 1
FREQUENCY: 0
ABDOMINAL PAIN: 0
DIZZINESS: 0
CONSTIPATION: 0
NAUSEA: 0
SHORTNESS OF BREATH: 0
PSYCHIATRIC NEGATIVE: 1
COUGH: 0
OCCASIONAL FEELINGS OF UNSTEADINESS: 1
FEVER: 0
FATIGUE: 0
WEAKNESS: 0
ABDOMINAL DISTENTION: 0
DIARRHEA: 0
VOMITING: 0
NUMBNESS: 0
DYSURIA: 0
DEPRESSION: 0
CHILLS: 0
PALPITATIONS: 0

## 2025-01-28 ASSESSMENT — PATIENT HEALTH QUESTIONNAIRE - PHQ9
1. LITTLE INTEREST OR PLEASURE IN DOING THINGS: NOT AT ALL
2. FEELING DOWN, DEPRESSED OR HOPELESS: NOT AT ALL
SUM OF ALL RESPONSES TO PHQ9 QUESTIONS 1 AND 2: 0

## 2025-01-28 ASSESSMENT — PAIN SCALES - GENERAL: PAINLEVEL_OUTOF10: 7

## 2025-01-28 NOTE — PROGRESS NOTES
Chief complaint: routine follow up visit    HPI:  Rosita Treviño is a 54 y.o. female with PMH of HFpEF, seizure disorder (on Keppra), chronic respiratory failure 2/2 asthma and restrictive pattern on PFT (baseline use 2-3L O2 BIPAP+ NC at night), Hodgkin's lymphoma in remission, L BRCA s/p mastectomy in remission, hx of partial hysterectomy, ENRIQUE (on nightly Bipap), and HTN presenting for routine follow up.    Since her last visit, patient has been doing well. She has been dealing with a lot of difficult family issues and social situations. Healthwise, she has been improving. She is adherent to her medications and BiPAP. She has not had any new health issues since her last visit. No new concerns.    She feels like she is a little volume overloaded today because she missed her most recent cardiology appt and ran out of her diuretics. She has not gotten her annual vaccines and does not want them at her age.    Medications:  Current Outpatient Medications   Medication Instructions    albuterol (Ventolin HFA) 90 mcg/actuation inhaler 2 puffs, inhalation, Every 4 hours PRN    albuterol 0.63 mg, Every 4 hours PRN    budesonide-formoteroL (Symbicort) 160-4.5 mcg/actuation inhaler 2 puffs, inhalation, 2 times daily RT    cyanocobalamin (Vitamin B-12) 1,000 mcg tablet 1 tablet, Daily    dapagliflozin propanediol (Farxiga) 10 mg TAKE 1 TABLET BY MOUTH DAILY    eplerenone (INSPRA) 50 mg, oral, Daily    ipratropium-albuteroL (Duo-Neb) 0.5-2.5 mg/3 mL nebulizer solution USE 1 VIAL IN NEBULIZER EVERY 8 HOURS AS DIRECTED    levETIRAcetam (KEPPRA) 500 mg, oral, 2 times daily    metOLazone (Zaroxolyn) 5 mg tablet TAKE 1 TABLET BY MOUTH 1 TIME A WEEK ON SUNDAYS    metoprolol succinate XL (TOPROL-XL) 100 mg, oral, Daily    oxygen (O2) therapy 3L BiPAP BLEED NIGHTLY    potassium chloride CR 20 mEq ER tablet 20 mEq, Daily    torsemide (DEMADEX) 40 mg, oral, 2 times daily       Allergies:  Allergies   Allergen Reactions    House Dust Cough     Iodinated Contrast Media Itching    Irbesartan Unknown    Salicylates Unknown    Aspirin Rash    Gadolinium-Containing Contrast Media Rash    Penicillins Rash and Hives       Past medical history:  Past Medical History:   Diagnosis Date    Acute atopic conjunctivitis, bilateral 08/28/2017    Allergic conjunctivitis of both eyes    Acute upper respiratory infection, unspecified 01/04/2017    URTI (acute upper respiratory infection)    Anesthesia of skin 06/19/2015    Numbness and tingling    Candidiasis of skin and nail 11/21/2013    Cutaneous candidiasis    Dietary folate deficiency anemia 06/22/2016    Anemia, macrocytic, nutritional    Dizziness and giddiness 12/20/2016    Lightheadedness    Encounter for screening for human immunodeficiency virus (HIV) 01/06/2016    Screening for HIV (human immunodeficiency virus)    Epilepsy, unspecified, not intractable, without status epilepticus 08/28/2017    Epilepsy    Hodgkin lymphoma, unspecified, unspecified site (Multi) 07/27/2013    Hodgkin's disease    Localized edema 06/24/2016    Leg edema    Obstructive sleep apnea (adult) (pediatric) 09/12/2014    ENRIQUE on CPAP    Other polyuria 07/06/2016    Diuresis    Other specified cough 05/01/2018    Cough with expectoration    Other specified disorders of the skin and subcutaneous tissue 09/12/2014    Skin plaque    Pain in left shoulder 05/21/2015    Left shoulder pain    Pain in unspecified ankle and joints of unspecified foot 11/18/2015    Ankle pain    Pain in unspecified foot 11/19/2015    Foot pain    Personal history of diseases of the blood and blood-forming organs and certain disorders involving the immune mechanism 01/12/2016    History of macrocytic anemia    Personal history of Hodgkin lymphoma 06/08/2022    History of Hodgkin's lymphoma    Personal history of non-Hodgkin lymphomas 03/09/2017    History of lymphoma    Personal history of other diseases of the circulatory system 10/25/2013    History of  hypertension    Personal history of other diseases of the respiratory system 01/15/2018    History of acute bronchitis    Personal history of other diseases of the respiratory system 09/12/2014    Personal history of asthma    Personal history of other diseases of the respiratory system 08/24/2016    History of allergic rhinitis    Personal history of other endocrine, nutritional and metabolic disease 11/28/2017    History of obesity    Personal history of other endocrine, nutritional and metabolic disease 12/20/2016    History of fluid overload    Personal history of other specified conditions 08/24/2016    History of shortness of breath    Personal history of other specified conditions 11/30/2015    History of wheezing    Personal history of other specified conditions 08/28/2017    History of headache    Personal history of other specified conditions 07/11/2017    History of chest pain    Personal history of other specified conditions 06/22/2016    History of fatigue    Personal history of pneumonia (recurrent) 01/12/2016    History of pneumonia    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    Lymphedema syndrome, postmastectomy    Right upper quadrant abdominal tenderness 02/23/2016    Right upper quadrant abdominal tenderness    Unspecified amblyopia, left eye     Amblyopia, left eye    Unspecified disorder of binocular vision 08/28/2017    Binocular visual disturbance       Surgical history:  Past Surgical History:   Procedure Laterality Date    HYSTERECTOMY  11/28/2017    Hysterectomy    MASTECTOMY  07/16/2014    Breast Surgery Mastectomy       Family history:  Family History   Problem Relation Name Age of Onset    Other (age macular degneration) Mother      Breast cancer Mother      Coronary artery disease Mother      Glaucoma Mother      Hypertension Mother      Heart attack Father          acute    Colon cancer Father      Coronary artery disease Father      Diabetes Father      Glaucoma Father       Heart attack Sister          acute    Hypertension Sister      Multiple sclerosis Sister      Hypertension Brother      Amblyopia Son         Social history:   reports that she has never smoked. She has never used smokeless tobacco. She reports that she does not drink alcohol and does not use drugs.    Health maintenance:  Health Maintenance   Topic Date Due    Medicare Annual Wellness Visit (AWV)  Never done    HIV Screening  Never done    MMR Vaccines (1 of 1 - Standard series) Never done    COVID-19 Vaccine (1) Never done    Hepatitis C Screening  Never done    Hepatitis B Vaccines (1 of 3 - 19+ 3-dose series) Never done    Hepatitis A Vaccines (1 of 2 - Risk 2-dose series) Never done    Zoster Vaccines (1 of 2) Never done    Mammogram  10/29/2014    Pneumococcal Vaccine (2 of 2 - PCV) 12/13/2014    Cervical Cancer Screening  07/05/2015    Colorectal Cancer Screening  09/17/2022    Influenza Vaccine (1) 09/01/2024    Echocardiogram  09/29/2024    Creatinine Level  01/24/2025    Potassium Level  01/24/2025    Diabetes: Hemoglobin A1C  01/24/2025    Diabetes Screening  01/24/2025    DTaP/Tdap/Td Vaccines (3 - Td or Tdap) 04/19/2026    Lipid Panel  01/24/2029    HIB Vaccines  Aged Out    IPV Vaccines  Aged Out    Meningococcal Vaccine  Aged Out    Rotavirus Vaccines  Aged Out    HPV Vaccines  Aged Out    Irritable Bowel Syndrome  Discontinued       Review of systems:  Review of Systems   Constitutional:  Negative for chills, fatigue and fever.   Respiratory:  Negative for cough and shortness of breath.    Cardiovascular:  Negative for chest pain, palpitations and leg swelling.   Gastrointestinal:  Negative for abdominal distention, abdominal pain, constipation, diarrhea, nausea and vomiting.   Genitourinary:  Negative for dysuria, frequency and urgency.   Skin:  Negative for rash.   Neurological:  Negative for dizziness, weakness and numbness.   Psychiatric/Behavioral: Negative.          Vitals:  There were no  vitals filed for this visit.    Physical exam:  Physical Exam  Constitutional:       General: She is not in acute distress.  HENT:      Head: Normocephalic and atraumatic.   Eyes:      Extraocular Movements: Extraocular movements intact.      Pupils: Pupils are equal, round, and reactive to light.   Neck:      Comments: Cervical kyphosis  Cardiovascular:      Rate and Rhythm: Normal rate and regular rhythm.      Pulses: Normal pulses.      Heart sounds: Normal heart sounds.      Comments: Mildly volume overloaded  Pulmonary:      Effort: Pulmonary effort is normal. No respiratory distress.      Comments: Coarse breath sounds  Abdominal:      General: Abdomen is flat. There is distension.      Palpations: Abdomen is soft.      Tenderness: There is no abdominal tenderness.   Skin:     General: Skin is warm and dry.   Neurological:      General: No focal deficit present.      Mental Status: She is alert and oriented to person, place, and time.   Psychiatric:         Mood and Affect: Mood normal.         Behavior: Behavior normal.         Labs:  Lab Results   Component Value Date    WBC 5.2 01/24/2024    HGB 11.3 (L) 01/24/2024    HCT 35.2 (L) 01/24/2024     (H) 01/24/2024     01/24/2024       Lab Results   Component Value Date    GLUCOSE 80 01/24/2024    CALCIUM 9.2 01/24/2024     01/24/2024    K 3.9 01/24/2024    CO2 35 (H) 01/24/2024    CL 98 01/24/2024    BUN 16 01/24/2024    CREATININE 0.91 01/24/2024       Lab Results   Component Value Date    HGBA1C 5.6 01/24/2024        Lab Results   Component Value Date    CHOL 165 01/24/2024    CHOL 122 10/21/2022    CHOL 124 05/12/2022     Lab Results   Component Value Date    HDL 48.0 01/24/2024    HDL 35.8 (A) 10/21/2022    HDL 28.3 (A) 05/12/2022     Lab Results   Component Value Date    LDLCALC 105 (H) 01/24/2024     Lab Results   Component Value Date    TRIG 62 01/24/2024    TRIG 115 10/21/2022    TRIG 64 11/28/2017     No components found for:  "\"CHOLHDL\"    Imaging:  No results found.    Assessment and plan:  Rosita Treviño is a 54 y.o. female with PMH of HFpEF, seizure disorder (on Keppra), chronic respiratory failure 2/2 asthma and restrictive pattern on PFT (baseline use 2-3L O2 BIPAP+ NC at night), Hodgkin's lymphoma in remission, L BRCA s/p mastectomy in remission, hx of partial hysterectomy, ENRIQUE (on nightly Bipap), and HTN presenting for routine follow up. Ordered CBC, CMP, A1c today. Lipid panel largely unremarkable last year.    #HTN  ::Pt on thiazide (metolazone 5mg) once weekly, BP slightly elevated today but normal on last reading.  ::ASCVD low 2.9%, , no indication for statin  -relatively well controlled on current med regimen      #HFpEF  #Palpitations  ::Pt w/EF 60-65% (Sept 2023), on dual diuresis; daily torsemide 40 mg BID and weekly metolazone 5 mg. Is slightly volume overloaded this visit.   ::Pt follows with Dr. Hilton for heart failure management  -taking Eplerenone, Farxiga per cardiologist  -c/w Metoprolol succinate 100 mg for palpitations     #Restrictive lung disease/Asthma/ chronic respiratory failure on 2-3 L oxygen  ::following with pulmonology. NTD    #Macrocytic anemia  ::MCV >100 since May 2022   ::Hg 11-12  -B12 levels previously checked and wnl  -CTM w/ annual CBC     #ENRIQUE  ::Follows with sleep medicine  -Pt is compliant with BiPAP at home, reports good sleep quality  -C/w nocturnal BIPAP     #Seizure Disorder  ::had one witnessed \"seizure\" 7/17 with admission to [facility] associated with respiratory arrest  ::started on Levetiracetam. Follows with neurology  -well controlled, has not seizures since 2017    #Hx of malignancy  ::no longer requiring follow up, in remission    HEALTH MAINTENANCE   Antibody Testing   HIV: negative refusing  Syphilis: refusing  Hepatitis C: refusing  Vaccines  Influenza: counseled pt, refusing  Shingles: counseled pt, refusing  Tdap: previous April 2016, due April 2026  Pneumonia: Received 2x " pneum vaccine (Dec 2013)   COVID: counseled pt, refusing  Cancer screening   Colonoscopy: Nl C-scope in 2021. Next in 2031.  Pap Smear: last done in jan 2022, normal results; encouraged to make appt with OBGYN for repeat smear  Mammogram: not indicated s/p b/l mastectomy  Plan   Follow-up in 1 year.    Patient and plan discussed with attending physician Dr. Álvarez.    William Carter MD

## 2025-01-28 NOTE — PROGRESS NOTES
I reviewed the resident/fellow's documentation and discussed the patient with the resident/fellow. I agree with the resident/fellow's medical decision making as documented in the note.    Case d/w Dr. Carter; agree with his A/P. I have personally reviewed the plan with the pt as well. She is doing well clinically. Follows up with sleep clinic, Pulm and Cardiology regularly. Would update blood work. Pt had a pap in 2022, unclear if HPV was checked at the time. Pt encouraged to follow up with Gyn as may need another pap.    Carrol Álvarez MD

## 2025-01-28 NOTE — PATIENT INSTRUCTIONS
As discussed today, our plan is:     Labs - we collected labs today and will call you with any abnormal results. If you have any questions or concerns prior to us calling feel free to call our office to have your questions addressed.   Medication changes: none  3.   If you smoke or use other tobacco products, take steps to quit. Call [phone] for more information or to set up an appointment with  [facility]. The [place] Tobacco Quit Line is a free resource for people who don’t have insurance, receive Medicaid, pregnant women, or members of the [place] [facility]. Call -763-QUIT-NOW or 1-480.328.5761.    Please come back to see us in: 1 year   ------  If you have any problems or questions, please contact the clinic at [phone] to leave a message. Our fax number is [phone]. If your issue cannot wait until the next business day, please go to urgent care or the emergency department.     I also strongly urge all of my patients to register for Social [facility] by going to: [email or web address]  (The  staff can also send you a text/email link to register when you check out).    No shows: It is understandable if you are unable to make it to a visit, but please cancel your appointment instead of not showing up. This helps to give other patients access to primary care and keeps wait times low.        [facility]   [phone]

## 2025-01-28 NOTE — PROGRESS NOTES
Advanced care planning discussed at this visit. Patient has a Healthcare Power of  and Living Willing but it is currently not on file. She expressed that her son David Mitchell has her permission to act as her surrogate decision maker in the event of an emergency. In addition, the patient has listed her daughter, Anum Treviño, as first alternate surrogate decision maker. Patient advised to bring in POA, Living Will and Advanced Care Plan for her chart at next visit.

## 2025-02-03 ENCOUNTER — APPOINTMENT (OUTPATIENT)
Dept: NEUROLOGY | Facility: HOSPITAL | Age: 55
End: 2025-02-03
Payer: MEDICARE

## 2025-03-04 ENCOUNTER — TELEMEDICINE (OUTPATIENT)
Dept: PULMONOLOGY | Facility: CLINIC | Age: 55
End: 2025-03-04
Payer: MEDICARE

## 2025-03-04 DIAGNOSIS — R09.02 HYPOXIA: ICD-10-CM

## 2025-03-04 DIAGNOSIS — J98.4 RESTRICTIVE LUNG DISEASE: ICD-10-CM

## 2025-03-04 DIAGNOSIS — J96.11 CHRONIC RESPIRATORY FAILURE WITH HYPOXIA (MULTI): ICD-10-CM

## 2025-03-04 DIAGNOSIS — R93.89 ABNORMAL CT OF THE CHEST: ICD-10-CM

## 2025-03-04 DIAGNOSIS — J45.909 MILD ASTHMA, UNSPECIFIED WHETHER COMPLICATED, UNSPECIFIED WHETHER PERSISTENT (HHS-HCC): Primary | ICD-10-CM

## 2025-03-04 PROCEDURE — 99212 OFFICE O/P EST SF 10 MIN: CPT | Performed by: NURSE PRACTITIONER

## 2025-03-04 RX ORDER — ALBUTEROL SULFATE 0.83 MG/ML
3 SOLUTION RESPIRATORY (INHALATION) ONCE
OUTPATIENT
Start: 2025-03-04 | End: 2025-03-04

## 2025-03-04 RX ORDER — ALBUTEROL SULFATE 90 UG/1
1 INHALANT RESPIRATORY (INHALATION) ONCE
OUTPATIENT
Start: 2025-03-04

## 2025-03-04 RX ORDER — BUDESONIDE AND FORMOTEROL FUMARATE DIHYDRATE 160; 4.5 UG/1; UG/1
2 AEROSOL RESPIRATORY (INHALATION)
Qty: 10.2 G | Refills: 11 | Status: SHIPPED | OUTPATIENT
Start: 2025-03-04

## 2025-03-04 RX ORDER — LORATADINE 10 MG/1
10 TABLET ORAL DAILY
COMMUNITY

## 2025-03-04 RX ORDER — INHALER, ASSIST DEVICES
SPACER (EA) MISCELLANEOUS
Qty: 1 EACH | Refills: 1 | Status: SHIPPED | OUTPATIENT
Start: 2025-03-04

## 2025-03-04 NOTE — PATIENT INSTRUCTIONS
Asthma.  PFTs with restriction - repeat in 11/2023 with restriction no BD response  - restart symbicort twice daily - rinse mouth out afterwards   - continue albuterol 2 puffs or albuterol nebulizers every 4-6 hours as needed for shortness of breath   - will get updated breathing tests between now and our next appt   - will refer to pulmonary rehab    Hypoxia:   - continue oxygen as needed with exertion during the day and at night with BiPAP     Restrictive lung disease/ Abnormal CT of the chest: CT chest with scarring - stable for several years   - continue BiPAP at night with 3L   - continue to follow with sleep medicine   - referral to pulmonary rehab      ENRIQUE: on BIPAP at night with 3L   - make follow up appt with sleep     Allergies:   - continue loratadine (claritin) 10mg daily       Thank you for visiting the Pulmonary clinic today!   Return to clinic  6 months after breathing tests or sooner if needed   Yumiko Morataya CNP  My office -  [phone]- Gunnar is my . Rowan is my nurse [phone].   Radiology scheduling [phone]   Appointment scheduling [phone]   Pulmonary function testing - [phone]

## 2025-03-04 NOTE — PROGRESS NOTES
Patient: Rosita Treviño    [id number]  : 1970 -- AGE 54 y.o.    Provider: BAN Lozano-CNP     Location [facility]   Service Date: 3/4/2025              [facility]  Follow up visit note      HISTORY OF PRESENT ILLNESS     DME: Mar[facility]   Cardiology: Dr. Merino/ Dr. Hilton   Sleep: Dr. Fitch     HISTORY OF PRESENT ILLNESS     Since last visit she has been doing good. She has been going through a lot lately - stressful situations. She caught a cold from her grand son - he had flu A/ strep. She has been using her symbicort twice daily - ran out recently. She uses her PRN albuterol HFA every once in a while. She feels she has had increased SOB since she was sick recently - she feels she is still bouncing back. She uses her albuterol nebulizers when she was sick. She has been wearing her oxygen with exertion - she has been using 3L continuously. She started the pulmonary rehab - ended up going to PT and lymphedema clinic. She is interested in restarting. She is coughing more - has issues catching her breath at times.  She has MODI with walking room to room at home. She has noticed some wheezing. She has SOB at rest at times with talking. She has had decreased appetite recently. She has lost weight over the last year or so. She wears her BiPAP at night - follows with sleep. She has noticed increased fatigue. She needs new supplies - states has issues with getting supplies since she moved in with her daughter. She does have chest pains - cardiologist aware. She takes loratadine daily for allergies.      23: Since last visit she has been ok. She has been using symbicort twice  - rarely uses her albuterol. She has had issues where she lives with the boiler - keeps her unit too hot.  She is moving into a new apartment soon. They keep turning her water on/off. She has also had issues with neighbors smoking.  She feels her fluid status is doing  much better. She does have a pulse ox at home - she states its normally is in the 90s on her oxygen. She has been using her oxygen at 3L continuously. She states she takes it off at times to get some fresh air. O2 desat today showed she does still need 2L with exertion. She states she has lost a lot of weight - states as her weight goes down the better she has been feeling. She has been working on eating less to try and lose weight. She as a been coughing - dry cough. She states normally related to exposures - smoke or cold air. She denies any wheezing, SOB at rest, or GERD. She has MODI if she does not wear her oxygen - she states if she does not wear her oxygen she feels weak and has palpitations. She got a little dizzy with walking. She has some seasonal allergies - takes claritin PRN, but has not needed any recently.  She has intermittent chest pains - less bothersome than previous. She states she is not concerned about it. She has been wearing her BIPAP with 3L at night. She has been in pulmonary rehab before - she has to check with her insurance to check how many more sessions she has.     11/30/23: She states she was living in [place] and moved back to [place]. She was recently admitted in 9/2023 for CHF exacerbation and fluid overload. She states her weight has been going up/ down - states she has been losing weight 198 - > 183 -> 181 -> 194. She feels she is having a little LE swelling and thinks this may be related to fluid.  She is frustrated related to her fluid status - has been watching her fluid and salt intake. She uses her symbicort twice daily and PRN albuterol not often. She has been using her albuterol nebulizers rarely - she had hospital at home and they recommended, but she did not feel like she needed it. She feels the symbicort works especially with the spacer. She feels she is filling up with fluid and has increased SOB and heaviness in her chest. She is tired of her oxygen - she would like  to get rid of it if she could. She did not taker her torsemide today -- waiting until after she goes home. She has dry cough. She previously had thick clear mucous - lately more dry. She is triggered by smells - cleaning products and smoke. She will notice wheezing  - rarely uses the albuterol so not sure if it helps. She has MODI when she is holding onto fluid - states once she goes home and takes her torsemide she feels her breathing is much better. She will have some SOB at rest when her fluid is up. She has runny nose, post nasal drip, and sneezing. She denies any congestion. She states dust makes her sneeze.  She has taken claritin in the past - feels it helps her. She denies any GERD. She denies any chest pain - more chest pressure from her fluid status. She will at times get phantom pain from her incisions in her chest. She uses ENRIQUE - on BiPAP and 3L.     Dr. Jim - 7/7/22 - 51 year old female with history of CHF, seizure disorder), chronic respiratory failure 2/2   asthma and COPD (baseline use 3L O2 PRN on NC at night) Hodgkin's lymphoma in remission, L BRCA s/p mastectomy in remission, ENRIQUE, and HTN  - She is here for pulmonary evaluation    - Previously seen by Dr. Sy in 2018 for her asthma    Was recently admitted for encephalopathy/acute on chronic respiratory failure from May 11-18 , treated for chf/asthma exacerbation  Prior to this she had another episode of pulmonary edema prior to moving back to [place]    Was in [place] for 3 years--just moved back in May 2022   Previously been to pulmonary rehab    Is using BiPAP at night --has been using every night    Seasonal allergies    Pulm meds: symbicort, albuterol     ALLERGIES AND MEDICATIONS     ALLERGIES  Allergies   Allergen Reactions    House Dust Cough    Iodinated Contrast Media Itching    Irbesartan Unknown    Salicylates Unknown    Aspirin Rash    Gadolinium-Containing Contrast Media Rash    Penicillins Rash and Hives        MEDICATIONS  Current Outpatient Medications   Medication Sig Dispense Refill    albuterol (Ventolin HFA) 90 mcg/actuation inhaler Inhale 2 puffs every 4 hours if needed for wheezing or shortness of breath. 18 g 3    albuterol 0.63 mg/3 mL nebulizer solution Take 3 mL (0.63 mg) by nebulization every 4 hours if needed.      budesonide-formoteroL (Symbicort) 160-4.5 mcg/actuation inhaler Inhale 2 puffs 2 times a day. 1 each 11    cyanocobalamin (Vitamin B-12) 1,000 mcg tablet Take 1 tablet (1,000 mcg) by mouth once daily.      dapagliflozin propanediol (Farxiga) 10 mg TAKE 1 TABLET BY MOUTH DAILY 30 tablet 11    eplerenone (Inspra) 50 mg tablet Take 1 tablet (50 mg) by mouth once daily. 30 tablet 11    ipratropium-albuteroL (Duo-Neb) 0.5-2.5 mg/3 mL nebulizer solution USE 1 VIAL IN NEBULIZER EVERY 8 HOURS AS DIRECTED 45 mL 0    levETIRAcetam (Keppra) 500 mg tablet Take 1 tablet (500 mg) by mouth 2 times a day. 180 tablet 3    metOLazone (Zaroxolyn) 5 mg tablet Take 1 tablet (5 mg) by mouth 1 (one) time per week. 12 tablet 3    metoprolol succinate XL (Toprol-XL) 100 mg 24 hr tablet TAKE 1 TABLET BY MOUTH EVERY DAY 90 tablet 3    oxygen (O2) therapy 3L BiPAP BLEED NIGHTLY      potassium chloride CR 20 mEq ER tablet Take 1 tablet (20 mEq) by mouth once daily. Do not crush or chew.      torsemide (Demadex) 20 mg tablet Take 2 tablets (40 mg) by mouth 2 times a day. 120 tablet 11     No current facility-administered medications for this visit.         PAST HISTORY     PAST MEDICAL HISTORY  - HFpEF - diastolic dysfunction   - seizure disorder   - COPD/ asthma - elevated right hemidiaphragm   - breast cancer (left) s/p mastectomy- 2015 - in remission   - Hodgkin's lymphoma - 1993 - s/p chemo / radiation (mediastinum) - in remission   - ENRIQUE - on BiPAP and 3L   - hysterectomy  - ? Hernia     PAST SURGICAL HISTORY  Past Surgical History:   Procedure Laterality Date    HYSTERECTOMY  11/28/2017    Hysterectomy    MASTECTOMY   07/16/2014    Breast Surgery Mastectomy       IMMUNIZATION HISTORY  Immunization History   Administered Date(s) Administered    Flu vaccine (IIV4), preservative free *Check age/dose* 12/02/2015    Flu vaccine, trivalent, preservative free, age 6 months and greater (Fluarix/Fluzone/Flulaval) 09/30/2013    Influenza, Unspecified 10/27/2010, 10/19/2011, 10/02/2012    Influenza, seasonal, injectable 12/02/2015, 12/02/2015    Pneumococcal polysaccharide vaccine, 23-valent, age 2 years and older (PNEUMOVAX 23) 12/13/2013    Tdap vaccine, age 7 year and older (BOOSTRIX, ADACEL) 10/27/2010, 04/19/2016       SOCIAL HISTORY  She  reports that she has never smoked. She has never used smokeless tobacco. She reports that she does not drink alcohol and does not use drugs.     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      FAMILY HISTORY  Family History   Problem Relation Name Age of Onset    Other (age macular degneration) Mother      Breast cancer Mother      Coronary artery disease Mother      Glaucoma Mother      Hypertension Mother      Heart attack Father          acute    Colon cancer Father      Coronary artery disease Father      Diabetes Father      Glaucoma Father      Heart attack Sister          acute    Hypertension Sister      Multiple sclerosis Sister      Hypertension Brother      Amblyopia Son       Several family members- asthma/ COPD - all maternal uncles and mother with COPD     RESULTS/DATA     Pulmonary Function Test Results       PFTs:   1/31/18 - FEV1/ FVC 0.75/ FEV1 2.22 (55%)/ FVC 2.74 (60%)  8/11/22 - FEV1/FVC 0.72/ FEV1 0.89 (40% - no BD resp)/ FVC 1.16 (43%)/ TLC 3.19 (76%)/ DLCO 61%   11/20/23 - FEV1/FVC 0.66/ FEV1 1.13 (47% no BD resp)/ FVC 1.67 (57%)/ TLC 3.09 (64%)/ DLCO 68%     6MWT:   9/9/22 - 303m () 88 -> 96 on 4L     O2 desat: 11/20/23 - 92% on RA at rest -> 85% with exertion. 98% at rest  on 2L -> 93% with exertion     Cardiopulmonary exercise test - 7/24/14 - abnormal cardiopulmonary exercise test. Findings are most concistent with a circulatory impairment and the probably the condition. The pulmnoary vascular component cannot be excluded.       Chest Radiograph     XR chest 1 view 07/01/2023- The lungs are clear.  No pneumothorax or effusion is evident. The  cardiomediastinal silhouette is  not enlarged.Degenerative change is  seen of the spine and shoulders. No acute cardiopulmonary process is evident.      Chest CT Scan     CT chest:   Multiple previous - oldest 4/7/18 - Stable radiation related changes in the bilateral upper lobe, additionally scarring/chronic lung changes in the right lower lobe posteromedially. A new 4 mm noncalcified nodule in the superior segment of the left lower lobe, attention warranted in follow-up imaging. Stable 3 mm nodule in the middle lobe since to prior exam, may likely represent benign. Calcified, mediastinal lymph node, unchanged, no suspicious adenopathy.   5/15/22 - No evidence of acute pulmonary embolism. 2. Stable postradiation therapy fibrotic changes in the bilateral lung apices and posteromedial right lower lobe. No new airspace consolidation, pleural effusion or pneumothorax. 3. Additional stable chronic findings as above.   9/4/23 -IMPRESSION: No acute cardiopulmonary process. Suggestion of distal gastric wall thickening. Please correlate for symptoms of gastritis. Mild ascites. Mild body wall edema/anasarca. Please correlate with physical examination to exclude cellulitis of the lower abdominal wall.       Echocardiogram     Echo: 9/29/23 - Left ventricular systolic function is normal with a 60-65% estimated ejection fraction.  2. There is mildly reduced right ventricular systolic function.  3. Moderate to severe mitral valve regurgitation.  4. There is moderately restricted tricuspid valve leaflet mobility.  5. Moderately elevated right ventricular  systolic pressure.  6. Severe tricuspid regurgitation visualized.  7. There is malcoaptation of the TV leaflets and central tricuspid regurgitation.  8. Mild aortic valve regurgitation.  RA normal size, RV mildly enlarged - mildly reduced RV systolic fnction     REVIEW OF SYSTEMS     REVIEW OF SYSTEMS  Review of Systems  Negative unless noted in HPI     PHYSICAL EXAM     VITAL SIGNS: There were no vitals taken for this visit.     CURRENT WEIGHT: [unfilled]  BMI: [unfilled]  PREVIOUS WEIGHTS:  Wt Readings from Last 3 Encounters:   01/28/25 95.4 kg (210 lb 4.8 oz)   01/09/25 95.7 kg (211 lb)   10/10/24 93 kg (205 lb)       Physical Exam- phone visit     ASSESSMENT/PLAN     Asthma.  PFTs with restriction - repeat in 11/2023 with restriction no BD response  - restart symbicort twice daily - rinse mouth out afterwards   - continue albuterol 2 puffs or albuterol nebulizers every 4-6 hours as needed for shortness of breath   - will get updated breathing tests between now and our next appt   - will refer to pulmonary rehab    Hypoxia:   - continue oxygen as needed with exertion during the day and at night with BiPAP     Restrictive lung disease/ Abnormal CT of the chest: CT chest with scarring - stable for several years   - continue BiPAP at night with 3L   - continue to follow with sleep medicine   - referral to pulmonary rehab      ENRIQUE: on BIPAP at night with 3L   - make follow up appt with sleep     Allergies:   - continue loratadine (claritin) 10mg daily       Thank you for visiting the Pulmonary clinic today!   Return to clinic  6 months after breathing tests or sooner if needed   Yumiko Morataya CNP  My office -  [phone]- Gunn[place] is my . Rowan is my nurse [phone].   Radiology scheduling [phone]   Appointment scheduling [phone]   Pulmonary function testing - [phone]      Virtual or Telephone Consent  A telephone visit (audio only) between the patient (at the originating  site) and the provider (at the distant site) was utilized to provide this telehealth service.     Prep: 12 minutes  Phone/Video: 16 minutes  Total: 28 minutes

## 2025-03-06 ENCOUNTER — TELEPHONE (OUTPATIENT)
Dept: CARDIAC REHAB | Facility: CLINIC | Age: 55
End: 2025-03-06
Payer: MEDICARE

## 2025-03-25 ENCOUNTER — APPOINTMENT (OUTPATIENT)
Dept: OPHTHALMOLOGY | Facility: CLINIC | Age: 55
End: 2025-03-25
Payer: COMMERCIAL

## 2025-03-25 DIAGNOSIS — H52.4 HYPEROPIA OF BOTH EYES WITH ASTIGMATISM AND PRESBYOPIA: Primary | ICD-10-CM

## 2025-03-25 DIAGNOSIS — H52.203 HYPEROPIA OF BOTH EYES WITH ASTIGMATISM AND PRESBYOPIA: Primary | ICD-10-CM

## 2025-03-25 DIAGNOSIS — H40.003 GLAUCOMA SUSPECT OF BOTH EYES: ICD-10-CM

## 2025-03-25 DIAGNOSIS — H52.03 HYPEROPIA OF BOTH EYES WITH ASTIGMATISM AND PRESBYOPIA: Primary | ICD-10-CM

## 2025-03-25 PROCEDURE — 92134 CPTRZ OPH DX IMG PST SGM RTA: CPT | Performed by: OPTOMETRIST

## 2025-03-25 PROCEDURE — 92004 COMPRE OPH EXAM NEW PT 1/>: CPT | Performed by: OPTOMETRIST

## 2025-03-25 PROCEDURE — 92015 DETERMINE REFRACTIVE STATE: CPT | Performed by: OPTOMETRIST

## 2025-03-25 RX ORDER — HYDROCHLOROTHIAZIDE 25 MG/1
TABLET ORAL
COMMUNITY

## 2025-03-25 RX ORDER — DILTIAZEM HYDROCHLORIDE EXTENDED-RELEASE TABLETS 180 MG/1
TABLET, EXTENDED RELEASE ORAL
COMMUNITY

## 2025-03-25 ASSESSMENT — TONOMETRY
OS_IOP_MMHG: 21
IOP_METHOD: TONOPEN
OD_IOP_MMHG: 17

## 2025-03-25 ASSESSMENT — REFRACTION_MANIFEST
OD_AXIS: 085
OS_AXIS: 090
OD_CYLINDER: -0.75
OD_ADD: +2.75
OD_SPHERE: +0.25
OS_SPHERE: +0.25
OD_AXIS: 090
OD_SPHERE: +0.25
OD_CYLINDER: -0.75
OS_SPHERE: +0.25
METHOD_AUTOREFRACTION: 1
OS_CYLINDER: -0.75
OS_AXIS: 095
OS_CYLINDER: -1.25
OS_ADD: +2.75

## 2025-03-25 ASSESSMENT — SLIT LAMP EXAM - LIDS
COMMENTS: GOOD POSITION
COMMENTS: GOOD POSITION

## 2025-03-25 ASSESSMENT — CONF VISUAL FIELD
METHOD: COUNTING FINGERS
OD_SUPERIOR_TEMPORAL_RESTRICTION: 0
OD_INFERIOR_TEMPORAL_RESTRICTION: 0
OS_INFERIOR_NASAL_RESTRICTION: 0
OS_NORMAL: 1
OS_SUPERIOR_NASAL_RESTRICTION: 0
OD_SUPERIOR_NASAL_RESTRICTION: 0
OS_INFERIOR_TEMPORAL_RESTRICTION: 0
OS_SUPERIOR_TEMPORAL_RESTRICTION: 0
OD_INFERIOR_NASAL_RESTRICTION: 0
OD_NORMAL: 1

## 2025-03-25 ASSESSMENT — ENCOUNTER SYMPTOMS
EYES NEGATIVE: 1
ALLERGIC/IMMUNOLOGIC NEGATIVE: 0
PSYCHIATRIC NEGATIVE: 0
HEMATOLOGIC/LYMPHATIC NEGATIVE: 0
MUSCULOSKELETAL NEGATIVE: 0
CONSTITUTIONAL NEGATIVE: 0
GASTROINTESTINAL NEGATIVE: 0
CARDIOVASCULAR NEGATIVE: 0
ENDOCRINE NEGATIVE: 0
NEUROLOGICAL NEGATIVE: 0
RESPIRATORY NEGATIVE: 0

## 2025-03-25 ASSESSMENT — VISUAL ACUITY
CORRECTION_TYPE: GLASSES
OD_CC: 20/25
OS_CC: 20/25
METHOD: SNELLEN - LINEAR

## 2025-03-25 ASSESSMENT — EXTERNAL EXAM - RIGHT EYE: OD_EXAM: NORMAL

## 2025-03-25 ASSESSMENT — CUP TO DISC RATIO
OD_RATIO: 0.45
OS_RATIO: 0.45

## 2025-03-25 ASSESSMENT — EXTERNAL EXAM - LEFT EYE: OS_EXAM: NORMAL

## 2025-03-25 NOTE — PROGRESS NOTES
Assessment/Plan   Problem List Items Addressed This Visit       Hyperopia of both eyes with astigmatism and presbyopia - Primary     Mild Rx.   Pt educated on findings. Release optional Rx for full time wear. Discussed adaptation. Monitor annually. Pt voiced understanding.          Glaucoma suspect of both eyes     Pt referred from [facility] for glaucoma testing.  FMHx: Mother  CD Ratio: 0.45/0.45 OD/OS  Intraocular pressure (IOP) today:  Highest intraocular pressure (IOP): 17/21mmHg  RNFL OCT: WNL, no RNFL thinning OU.    Pt educated on findings. Monitor annually with CEE and RNFL OCT only at this time. Pt voiced understanding.           Relevant Orders    OCT, Retina - OU - Both Eyes (Completed)    OCT, Optic Nerve - OU - Both Eyes (Completed)

## 2025-03-25 NOTE — ASSESSMENT & PLAN NOTE
Mild Rx.   Pt educated on findings. Release optional Rx for full time wear. Discussed adaptation. Monitor annually. Pt voiced understanding.

## 2025-03-25 NOTE — ASSESSMENT & PLAN NOTE
Pt referred from [facility] for glaucoma testing.  FMHx: Mother  CD Ratio: 0.45/0.45 OD/OS  Intraocular pressure (IOP) today:  Highest intraocular pressure (IOP): 17/21mmHg  RNFL OCT: WNL, no RNFL thinning OU.    Pt educated on findings. Monitor annually with CEE and RNFL OCT only at this time. Pt voiced understanding.

## 2025-04-02 RX ORDER — ALBUTEROL SULFATE 0.83 MG/ML
3 SOLUTION RESPIRATORY (INHALATION) ONCE
OUTPATIENT
Start: 2025-04-02 | End: 2025-04-02

## 2025-04-02 RX ORDER — ALBUTEROL SULFATE 90 UG/1
4 INHALANT RESPIRATORY (INHALATION) ONCE
OUTPATIENT
Start: 2025-04-02 | End: 2025-04-02

## 2025-04-03 ENCOUNTER — HOSPITAL ENCOUNTER (OUTPATIENT)
Dept: RESPIRATORY THERAPY | Facility: HOSPITAL | Age: 55
Discharge: HOME | End: 2025-04-03
Payer: MEDICARE

## 2025-04-03 DIAGNOSIS — J45.909 MILD ASTHMA, UNSPECIFIED WHETHER COMPLICATED, UNSPECIFIED WHETHER PERSISTENT (HHS-HCC): ICD-10-CM

## 2025-04-09 ENCOUNTER — TELEPHONE (OUTPATIENT)
Dept: PRIMARY CARE | Facility: HOSPITAL | Age: 55
End: 2025-04-09
Payer: MEDICARE

## 2025-04-09 DIAGNOSIS — E87.6 HYPOKALEMIA DUE TO LOSS OF POTASSIUM: ICD-10-CM

## 2025-04-09 RX ORDER — POTASSIUM CHLORIDE 20 MEQ/1
20 TABLET, EXTENDED RELEASE ORAL DAILY
Qty: 90 TABLET | Refills: 0 | Status: SHIPPED | OUTPATIENT
Start: 2025-04-09

## 2025-04-09 NOTE — TELEPHONE ENCOUNTER
Patient requesting potassium refill. A 90 day supply was sent to pharmacy. She will need lab work prior to next refill (last labs from 1/2024). She will see her cardiologist in 6/2025 and should have labs done at that time.

## 2025-04-14 ENCOUNTER — OFFICE VISIT (OUTPATIENT)
Dept: NEUROLOGY | Facility: HOSPITAL | Age: 55
End: 2025-04-14
Payer: MEDICARE

## 2025-04-14 VITALS — DIASTOLIC BLOOD PRESSURE: 69 MMHG | SYSTOLIC BLOOD PRESSURE: 107 MMHG | HEART RATE: 80 BPM

## 2025-04-14 DIAGNOSIS — G40.909 SEIZURE DISORDER (MULTI): Primary | ICD-10-CM

## 2025-04-14 PROCEDURE — 99214 OFFICE O/P EST MOD 30 MIN: CPT | Performed by: STUDENT IN AN ORGANIZED HEALTH CARE EDUCATION/TRAINING PROGRAM

## 2025-04-14 PROCEDURE — 3078F DIAST BP <80 MM HG: CPT | Performed by: STUDENT IN AN ORGANIZED HEALTH CARE EDUCATION/TRAINING PROGRAM

## 2025-04-14 PROCEDURE — 3074F SYST BP LT 130 MM HG: CPT | Performed by: STUDENT IN AN ORGANIZED HEALTH CARE EDUCATION/TRAINING PROGRAM

## 2025-04-14 PROCEDURE — 99204 OFFICE O/P NEW MOD 45 MIN: CPT | Performed by: STUDENT IN AN ORGANIZED HEALTH CARE EDUCATION/TRAINING PROGRAM
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
## 2025-04-22 ENCOUNTER — PATIENT MESSAGE (OUTPATIENT)
Dept: SLEEP MEDICINE | Facility: HOSPITAL | Age: 55
End: 2025-04-22
Payer: MEDICARE

## 2025-04-25 ENCOUNTER — HOSPITAL ENCOUNTER (OUTPATIENT)
Dept: RESPIRATORY THERAPY | Facility: HOSPITAL | Age: 55
End: 2025-04-25
Payer: MEDICARE

## 2025-05-22 ENCOUNTER — HOSPITAL ENCOUNTER (OUTPATIENT)
Dept: RESPIRATORY THERAPY | Facility: HOSPITAL | Age: 55
Discharge: HOME | End: 2025-05-22
Payer: MEDICARE

## 2025-05-22 DIAGNOSIS — J96.11 CHRONIC RESPIRATORY FAILURE WITH HYPOXIA: ICD-10-CM

## 2025-05-22 DIAGNOSIS — J45.909 MILD ASTHMA, UNSPECIFIED WHETHER COMPLICATED, UNSPECIFIED WHETHER PERSISTENT (HHS-HCC): ICD-10-CM

## 2025-05-22 PROCEDURE — 94618 PULMONARY STRESS TESTING: CPT | Performed by: INTERNAL MEDICINE

## 2025-05-22 PROCEDURE — 94060 EVALUATION OF WHEEZING: CPT | Performed by: INTERNAL MEDICINE

## 2025-05-22 PROCEDURE — 94729 DIFFUSING CAPACITY: CPT | Performed by: INTERNAL MEDICINE

## 2025-05-22 PROCEDURE — 2500000001 HC RX 250 WO HCPCS SELF ADMINISTERED DRUGS (ALT 637 FOR MEDICARE OP): Performed by: NURSE PRACTITIONER

## 2025-05-22 PROCEDURE — 94726 PLETHYSMOGRAPHY LUNG VOLUMES: CPT | Performed by: INTERNAL MEDICINE

## 2025-05-22 PROCEDURE — 94726 PLETHYSMOGRAPHY LUNG VOLUMES: CPT

## 2025-05-22 RX ORDER — ALBUTEROL SULFATE 0.83 MG/ML
3 SOLUTION RESPIRATORY (INHALATION) ONCE
Status: COMPLETED | OUTPATIENT
Start: 2025-05-22 | End: 2025-05-22

## 2025-05-22 RX ORDER — ALBUTEROL SULFATE 90 UG/1
4 INHALANT RESPIRATORY (INHALATION) ONCE
Status: COMPLETED | OUTPATIENT
Start: 2025-05-22 | End: 2025-05-22

## 2025-05-22 RX ADMIN — ALBUTEROL SULFATE 4 PUFF: 90 AEROSOL, METERED RESPIRATORY (INHALATION) at 15:26

## 2025-07-11 ENCOUNTER — TELEPHONE (OUTPATIENT)
Dept: SLEEP MEDICINE | Facility: HOSPITAL | Age: 55
End: 2025-07-11
Payer: MEDICARE

## 2025-07-11 NOTE — TELEPHONE ENCOUNTER
Returned pt VM    Pt is traveling, will be getting care in [place] for the time being and will return to [place] after her travels/visits. Advised to keep copies of any medical care so we can review upon her return.

## 2025-08-07 ENCOUNTER — TELEMEDICINE (OUTPATIENT)
Dept: PRIMARY CARE | Facility: HOSPITAL | Age: 55
End: 2025-08-07
Payer: MEDICARE

## 2025-08-07 DIAGNOSIS — I50.30 HEART FAILURE WITH PRESERVED EJECTION FRACTION, UNSPECIFIED HF CHRONICITY: ICD-10-CM

## 2025-08-07 DIAGNOSIS — R56.9 SEIZURE (MULTI): ICD-10-CM

## 2025-08-07 DIAGNOSIS — I50.32 CHRONIC DIASTOLIC HEART FAILURE: ICD-10-CM

## 2025-08-07 DIAGNOSIS — R32 URINARY INCONTINENCE, UNSPECIFIED TYPE: Primary | ICD-10-CM

## 2025-08-07 DIAGNOSIS — I50.32 CHRONIC HEART FAILURE WITH PRESERVED EJECTION FRACTION: ICD-10-CM

## 2025-08-07 DIAGNOSIS — Z00.00 ROUTINE GENERAL MEDICAL EXAMINATION AT A HEALTH CARE FACILITY: ICD-10-CM

## 2025-08-07 DIAGNOSIS — E87.6 HYPOKALEMIA DUE TO LOSS OF POTASSIUM: ICD-10-CM

## 2025-08-07 DIAGNOSIS — J45.909 MILD ASTHMA, UNSPECIFIED WHETHER COMPLICATED, UNSPECIFIED WHETHER PERSISTENT (HHS-HCC): ICD-10-CM

## 2025-08-07 RX ORDER — DIAPER,BRIEF,ADULT, DISPOSABLE
EACH MISCELLANEOUS
Qty: 80 EACH | Refills: 11 | Status: SHIPPED | OUTPATIENT
Start: 2025-08-07

## 2025-08-07 RX ORDER — LEVETIRACETAM 500 MG/1
500 TABLET ORAL 2 TIMES DAILY
Qty: 180 TABLET | Refills: 3 | Status: SHIPPED | OUTPATIENT
Start: 2025-08-07 | End: 2026-08-02

## 2025-08-07 RX ORDER — METOLAZONE 5 MG/1
5 TABLET ORAL WEEKLY
Qty: 12 TABLET | Refills: 3 | Status: SHIPPED | OUTPATIENT
Start: 2025-08-07

## 2025-08-07 RX ORDER — DAPAGLIFLOZIN 10 MG/1
10 TABLET, FILM COATED ORAL DAILY
Qty: 30 TABLET | Refills: 11 | Status: SHIPPED | OUTPATIENT
Start: 2025-08-07 | End: 2026-08-07

## 2025-08-07 RX ORDER — ALBUTEROL SULFATE 90 UG/1
2 INHALANT RESPIRATORY (INHALATION) EVERY 4 HOURS PRN
Qty: 18 G | Refills: 3 | Status: SHIPPED | OUTPATIENT
Start: 2025-08-07

## 2025-08-07 RX ORDER — BUDESONIDE AND FORMOTEROL FUMARATE DIHYDRATE 160; 4.5 UG/1; UG/1
2 AEROSOL RESPIRATORY (INHALATION)
Qty: 10.2 G | Refills: 11 | Status: SHIPPED | OUTPATIENT
Start: 2025-08-07

## 2025-08-07 RX ORDER — EPLERENONE 50 MG/1
50 TABLET ORAL DAILY
Qty: 30 TABLET | Refills: 11 | Status: SHIPPED | OUTPATIENT
Start: 2025-08-07 | End: 2026-08-07

## 2025-08-07 RX ORDER — TORSEMIDE 20 MG/1
40 TABLET ORAL 2 TIMES DAILY
Qty: 120 TABLET | Refills: 11 | Status: SHIPPED | OUTPATIENT
Start: 2025-08-07

## 2025-08-07 RX ORDER — POTASSIUM CHLORIDE 20 MEQ/1
20 TABLET, EXTENDED RELEASE ORAL DAILY
Qty: 90 TABLET | Refills: 0 | Status: SHIPPED | OUTPATIENT
Start: 2025-08-07

## 2025-08-07 RX ORDER — METOPROLOL SUCCINATE 100 MG/1
100 TABLET, EXTENDED RELEASE ORAL DAILY
Qty: 90 TABLET | Refills: 3 | Status: SHIPPED | OUTPATIENT
Start: 2025-08-07

## 2025-08-07 ASSESSMENT — ENCOUNTER SYMPTOMS
DIZZINESS: 0
NUMBNESS: 0
FREQUENCY: 1
CONSTIPATION: 0
COUGH: 1
WEAKNESS: 0
PALPITATIONS: 0
DIARRHEA: 0
FEVER: 0
VOMITING: 0
ABDOMINAL PAIN: 0
NAUSEA: 0
CHILLS: 0
FATIGUE: 1
PSYCHIATRIC NEGATIVE: 1
ABDOMINAL DISTENTION: 0
DYSURIA: 0
SHORTNESS OF BREATH: 1

## 2025-08-07 NOTE — PROGRESS NOTES
Chief complaint: routine follow up visit    HPI:  Rosita Treviño is a 54 y.o. female with PMH of HFpEF, seizure disorder (on Keppra), chronic respiratory failure 2/2 asthma and restrictive pattern on PFT (baseline use 2-3L O2 BIPAP+ NC at night), Hodgkin's lymphoma in remission, L BRCA s/p mastectomy in remission, hx of partial hysterectomy, ENRIQUE (on nightly Bipap), and HTN presenting for DME and follow up.    Since her last appt in January, patient says she has been doing okay.  She reports worsening with her breathing and increased heaviness and water in her legs.  She got PFTs and pulmonary stress test/walking test done and would like to know the results.  She has been adherent to her medications, but needs refills on her BiPAP supplies and breast DME.  She has been going through a lot of stress recently because of various life events.  She is planning to move to [place] temporarily because her son is getting  down there.    She remains on 3 L nasal cannula and uses her rescue inhaler infrequently.  She has been very tired and naps multiple times a day.  She complains of chronic fatigue.  She remains in a good mood and wants to live a long life and is motivated to continue to improve on her health.  She has had increased urinary frequency and some urinary incontinence because of her diuretics.    Medications:  Current Outpatient Medications   Medication Instructions    albuterol (Ventolin HFA) 90 mcg/actuation inhaler 2 puffs, inhalation, Every 4 hours PRN    albuterol 0.63 mg, Every 4 hours PRN    budesonide-formoterol (Symbicort) 160-4.5 mcg/actuation inhaler 2 puffs, inhalation, 2 times daily RT    cyanocobalamin (Vitamin B-12) 1,000 mcg tablet 1 tablet, Daily    dapagliflozin propanediol (Farxiga) 10 mg tablet TAKE 1 TABLET BY MOUTH DAILY    diaper,brief,adult,disposable (Depend Silhouette Women L/XL) misc Use as needed for incontinence.    eplerenone (INSPRA) 50 mg, oral, Daily    inhalational spacing  device (Aerochamber MV) inhaler Use as instructed    ipratropium-albuteroL (Duo-Neb) 0.5-2.5 mg/3 mL nebulizer solution USE 1 VIAL IN NEBULIZER EVERY 8 HOURS AS DIRECTED    levETIRAcetam (KEPPRA) 500 mg, oral, 2 times daily    loratadine (CLARITIN) 10 mg, Daily    metOLazone (ZAROXOLYN) 5 mg, oral, Weekly    metoprolol succinate XL (TOPROL-XL) 100 mg, oral, Daily    oxygen (O2) therapy 3L BiPAP BLEED NIGHTLY    potassium chloride CR 20 mEq ER tablet 20 mEq, oral, Daily    torsemide (DEMADEX) 40 mg, oral, 2 times daily       Allergies:  Allergies   Allergen Reactions    House Dust Cough    Iodinated Contrast Media Itching    Irbesartan Unknown    Salicylates Unknown    Aspirin Rash    Gadolinium-Containing Contrast Media Rash    Penicillins Rash and Hives       Past medical history:  Past Medical History:   Diagnosis Date    Acute atopic conjunctivitis, bilateral 08/28/2017    Allergic conjunctivitis of both eyes    Acute upper respiratory infection, unspecified 01/04/2017    URTI (acute upper respiratory infection)    Anesthesia of skin 06/19/2015    Numbness and tingling    Candidiasis of skin and nail 11/21/2013    Cutaneous candidiasis    Dietary folate deficiency anemia 06/22/2016    Anemia, macrocytic, nutritional    Dizziness and giddiness 12/20/2016    Lightheadedness    Encounter for screening for human immunodeficiency virus (HIV) 01/06/2016    Screening for HIV (human immunodeficiency virus)    Epilepsy, unspecified, not intractable, without status epilepticus 08/28/2017    Epilepsy    Hodgkin lymphoma, unspecified, unspecified site (Multi) 07/27/2013    Hodgkin's disease    Localized edema 06/24/2016    Leg edema    Obstructive sleep apnea (adult) (pediatric) 09/12/2014    ENRIQUE on CPAP    Other polyuria 07/06/2016    Diuresis    Other specified cough 05/01/2018    Cough with expectoration    Other specified disorders of the skin and subcutaneous tissue 09/12/2014    Skin plaque    Pain in left shoulder  05/21/2015    Left shoulder pain    Pain in unspecified ankle and joints of unspecified foot 11/18/2015    Ankle pain    Pain in unspecified foot 11/19/2015    Foot pain    Personal history of diseases of the blood and blood-forming organs and certain disorders involving the immune mechanism 01/12/2016    History of macrocytic anemia    Personal history of Hodgkin lymphoma 06/08/2022    History of Hodgkin's lymphoma    Personal history of non-Hodgkin lymphomas 03/09/2017    History of lymphoma    Personal history of other diseases of the circulatory system 10/25/2013    History of hypertension    Personal history of other diseases of the respiratory system 01/15/2018    History of acute bronchitis    Personal history of other diseases of the respiratory system 09/12/2014    Personal history of asthma    Personal history of other diseases of the respiratory system 08/24/2016    History of allergic rhinitis    Personal history of other endocrine, nutritional and metabolic disease 11/28/2017    History of obesity    Personal history of other endocrine, nutritional and metabolic disease 12/20/2016    History of fluid overload    Personal history of other specified conditions 08/24/2016    History of shortness of breath    Personal history of other specified conditions 11/30/2015    History of wheezing    Personal history of other specified conditions 08/28/2017    History of headache    Personal history of other specified conditions 07/11/2017    History of chest pain    Personal history of other specified conditions 06/22/2016    History of fatigue    Personal history of pneumonia (recurrent) 01/12/2016    History of pneumonia    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    Lymphedema syndrome, postmastectomy    Right upper quadrant abdominal tenderness 02/23/2016    Right upper quadrant abdominal tenderness    Unspecified amblyopia, left eye     Amblyopia, left eye    Unspecified disorder of binocular vision  08/28/2017    Binocular visual disturbance       Surgical history:  Past Surgical History:   Procedure Laterality Date    HYSTERECTOMY  11/28/2017    Hysterectomy    MASTECTOMY  07/16/2014    Breast Surgery Mastectomy       Family history:  Family History   Problem Relation Name Age of Onset    Other (age macular degneration) Mother      Breast cancer Mother      Coronary artery disease Mother      Glaucoma Mother      Hypertension Mother      Heart attack Father          acute    Colon cancer Father      Coronary artery disease Father      Diabetes Father      Glaucoma Father      Heart attack Sister          acute    Hypertension Sister      Multiple sclerosis Sister      Hypertension Brother      Amblyopia Son         Social history:   reports that she has never smoked. She has never used smokeless tobacco. She reports that she does not drink alcohol and does not use drugs.    Health maintenance:  Health Maintenance   Topic Date Due    Medicare Annual Wellness Visit (AWV)  Never done    HIV Screening  Never done    MMR Vaccines (1 of 1 - Standard series) Never done    COVID-19 Vaccine (1) Never done    Hepatitis C Screening  Never done    Hepatitis B Vaccines (1 of 3 - 19+ 3-dose series) Never done    Hepatitis A Vaccines (1 of 2 - Risk 2-dose series) Never done    Zoster Vaccines (1 of 2) Never done    Mammogram  10/29/2014    Pneumococcal Vaccine (2 of 2 - PCV) 12/13/2014    Cervical Cancer Screening  07/05/2015    Colorectal Cancer Screening  09/17/2022    Echocardiogram  09/29/2024    Creatinine Level  01/24/2025    Potassium Level  01/24/2025    Diabetes: Hemoglobin A1C  01/24/2025    Diabetes Screening  01/24/2025    Influenza Vaccine (1) 09/01/2025    DTaP/Tdap/Td Vaccines (3 - Td or Tdap) 04/19/2026    Lipid Panel  01/24/2029    HIB Vaccines  Aged Out    IPV Vaccines  Aged Out    Meningococcal Vaccine  Aged Out    Rotavirus Vaccines  Aged Out    HPV Vaccines  Aged Out    Irritable Bowel Syndrome   Discontinued       Review of systems:  Review of Systems   Constitutional:  Positive for fatigue. Negative for chills and fever.   Respiratory:  Positive for cough and shortness of breath.    Cardiovascular:  Negative for chest pain, palpitations and leg swelling.   Gastrointestinal:  Negative for abdominal distention, abdominal pain, constipation, diarrhea, nausea and vomiting.   Genitourinary:  Positive for frequency. Negative for dysuria and urgency.   Skin:  Negative for rash.   Neurological:  Negative for dizziness, weakness and numbness.   Psychiatric/Behavioral: Negative.          Vitals:  There were no vitals filed for this visit.    Physical exam:  Physical Exam  Constitutional:       General: She is not in acute distress.  HENT:      Head: Normocephalic and atraumatic.   Pulmonary:      Effort: Pulmonary effort is normal.     Neurological:      General: No focal deficit present.      Mental Status: She is alert.     Psychiatric:         Mood and Affect: Mood normal.         Behavior: Behavior normal.         Labs:  Lab Results   Component Value Date    WBC 5.2 01/24/2024    HGB 11.3 (L) 01/24/2024    HCT 35.2 (L) 01/24/2024     (H) 01/24/2024     01/24/2024       Lab Results   Component Value Date    GLUCOSE 80 01/24/2024    CALCIUM 9.2 01/24/2024     01/24/2024    K 3.9 01/24/2024    CO2 35 (H) 01/24/2024    CL 98 01/24/2024    BUN 16 01/24/2024    CREATININE 0.91 01/24/2024       Lab Results   Component Value Date    HGBA1C 5.6 01/24/2024        Lab Results   Component Value Date    CHOL 165 01/24/2024    CHOL 122 10/21/2022    CHOL 124 05/12/2022     Lab Results   Component Value Date    HDL 48.0 01/24/2024    HDL 35.8 (A) 10/21/2022    HDL 28.3 (A) 05/12/2022     Lab Results   Component Value Date    LDLCALC 105 (H) 01/24/2024     Lab Results   Component Value Date    TRIG 62 01/24/2024    TRIG 115 10/21/2022    TRIG 64 11/28/2017     No components found for:  "\"CHOLHDL\"    Imaging:  No results found.    Assessment and plan:  Rosita Treviño is a 54 y.o. female with PMH of HFpEF, seizure disorder (on Keppra), chronic respiratory failure 2/2 asthma and restrictive pattern on PFT (baseline use 2-3L O2 BIPAP+ NC at night), Hodgkin's lymphoma in remission, L BRCA s/p mastectomy in remission, hx of partial hysterectomy, ENRIQUE (on nightly Bipap), and HTN presenting for routine follow up. Encouraged patient to have annual labs drawn prior to leaving [facility]. Ordered today. Refilled meds and DME.     #HFpEF  #HTN  #Palpitations  ::Pt w/EF 60-65% (Sept 2023), on dual diuresis; daily torsemide 40 mg BID and weekly metolazone 5 mg. Is slightly volume overloaded this visit.   ::Pt follows with Dr. Hilton for heart failure management  -taking Eplerenone, Farxiga per cardiologist  -c/w Metoprolol succinate 100 mg for palpitations     #Restrictive lung disease/Asthma/ chronic respiratory failure on 2-3 L oxygen  ::following with pulmonology  ::recent PFT with worsening severe obstructive airway disease without restrictive pattern that is responsive to bronchodilator therapy  -Refilled albuterol and symbicort  -Pulmonary rehab ordered  -Remains on supplemental O2    #Macrocytic anemia  ::MCV >100 since May 2022   ::Hg 11-12  -B12 levels previously checked and wnl  -CTM w/ annual CBC     #ENRIQUE  ::Follows with sleep medicine  ::Pt is compliant with BiPAP at home, reports good sleep quality  -C/w nocturnal BIPAP  -Needs refill on BiPAP supplies, encouraged to make appt with sleep medicine for this     #Seizure Disorder  ::had one witnessed \"seizure\" 7/17 with admission to [facility] associated with respiratory arrest  ::started on Levetiracetam. Follows with neurology  -well controlled, has not seizures since 2017    #Hx of breast malignancy  ::no longer requiring follow up, in remission  -Breast DME ordered today    HEALTH MAINTENANCE   Antibody Testing   HIV: refusing  Syphilis: refusing  Hepatitis C: " refusing  Vaccines  Influenza: counseled pt, refusing  Shingles: counseled pt, refusing  Tdap: previous April 2016, due April 2026  Pneumonia: Received 2x pneum vaccine (Dec 2013)   COVID: counseled pt, refusing  Cancer screening   Colonoscopy: Nl C-scope in 2021. Next in 2031.  Pap Smear: last done in jan 2022, normal results; encouraged to make appt with OBGYN for repeat smear  Mammogram: not indicated s/p b/l mastectomy  Plan   Follow-up in 1 year.    Patient and plan discussed with attending physician Dr. Álvarez.    William Carter MD

## 2025-08-11 ENCOUNTER — TELEPHONE (OUTPATIENT)
Dept: CARDIAC REHAB | Facility: CLINIC | Age: 55
End: 2025-08-11
Payer: MEDICARE

## 2025-08-12 DIAGNOSIS — J45.909 MILD ASTHMA, UNSPECIFIED WHETHER COMPLICATED, UNSPECIFIED WHETHER PERSISTENT (HHS-HCC): ICD-10-CM

## 2025-08-21 ENCOUNTER — APPOINTMENT (OUTPATIENT)
Dept: CARDIOLOGY | Facility: HOSPITAL | Age: 55
End: 2025-08-21
Payer: MEDICARE

## 2025-08-21 ENCOUNTER — TELEMEDICINE (OUTPATIENT)
Dept: CARDIOLOGY | Facility: HOSPITAL | Age: 55
End: 2025-08-21
Payer: MEDICARE

## 2025-08-21 DIAGNOSIS — I50.32 CHRONIC DIASTOLIC HEART FAILURE: Primary | ICD-10-CM

## 2025-08-21 DIAGNOSIS — I34.0 NONRHEUMATIC MITRAL (VALVE) INSUFFICIENCY: ICD-10-CM

## 2025-08-21 PROCEDURE — 1036F TOBACCO NON-USER: CPT | Performed by: INTERNAL MEDICINE

## 2025-08-21 PROCEDURE — 99213 OFFICE O/P EST LOW 20 MIN: CPT | Performed by: INTERNAL MEDICINE

## 2026-03-25 ENCOUNTER — APPOINTMENT (OUTPATIENT)
Dept: OPHTHALMOLOGY | Facility: CLINIC | Age: 56
End: 2026-03-25
Payer: MEDICARE